# Patient Record
Sex: FEMALE | Race: WHITE | NOT HISPANIC OR LATINO | Employment: FULL TIME | URBAN - METROPOLITAN AREA
[De-identification: names, ages, dates, MRNs, and addresses within clinical notes are randomized per-mention and may not be internally consistent; named-entity substitution may affect disease eponyms.]

---

## 2023-09-01 ENCOUNTER — HOSPITAL ENCOUNTER (OUTPATIENT)
Dept: DATA CONVERSION | Facility: HOSPITAL | Age: 63
Discharge: HOME | End: 2023-09-01
Payer: COMMERCIAL

## 2023-09-01 DIAGNOSIS — E78.00 PURE HYPERCHOLESTEROLEMIA, UNSPECIFIED: ICD-10-CM

## 2023-09-01 DIAGNOSIS — Z00.00 ENCOUNTER FOR GENERAL ADULT MEDICAL EXAMINATION WITHOUT ABNORMAL FINDINGS: ICD-10-CM

## 2023-09-01 DIAGNOSIS — E03.9 HYPOTHYROIDISM, UNSPECIFIED: ICD-10-CM

## 2023-09-01 LAB
ALBUMIN SERPL-MCNC: 4.6 GM/DL (ref 3.5–5)
ALBUMIN/GLOB SERPL: 1.6 RATIO (ref 1.5–3)
ALP BLD-CCNC: 67 U/L (ref 35–125)
ALT SERPL-CCNC: 13 U/L (ref 5–40)
ANION GAP SERPL CALCULATED.3IONS-SCNC: 11 MMOL/L (ref 0–19)
APPEARANCE PLAS: ABNORMAL
AST SERPL-CCNC: 21 U/L (ref 5–40)
BACTERIA UR QL AUTO: NEGATIVE
BASOPHILS # BLD AUTO: 0.07 K/UL (ref 0–0.22)
BASOPHILS NFR BLD AUTO: 1 % (ref 0–1)
BILIRUB SERPL-MCNC: 0.7 MG/DL (ref 0.1–1.2)
BILIRUB UR QL STRIP.AUTO: NEGATIVE
BUN SERPL-MCNC: 14 MG/DL (ref 8–25)
BUN/CREAT SERPL: 12.7 RATIO (ref 8–21)
CALCIUM SERPL-MCNC: 9.5 MG/DL (ref 8.5–10.4)
CHLORIDE SERPL-SCNC: 104 MMOL/L (ref 97–107)
CHOLEST SERPL-MCNC: 344 MG/DL (ref 133–200)
CHOLEST/HDLC SERPL: 4 RATIO
CLARITY UR: CLEAR
CO2 SERPL-SCNC: 26 MMOL/L (ref 24–31)
COLOR SPUN FLD: ABNORMAL
COLOR UR: ABNORMAL
CREAT SERPL-MCNC: 1.1 MG/DL (ref 0.4–1.6)
DEPRECATED RDW RBC AUTO: 49.9 FL (ref 37–54)
DIFFERENTIAL METHOD BLD: NORMAL
EOSINOPHIL # BLD AUTO: 0.14 K/UL (ref 0–0.45)
EOSINOPHIL NFR BLD: 2.1 % (ref 0–3)
ERYTHROCYTE [DISTWIDTH] IN BLOOD BY AUTOMATED COUNT: 14.5 % (ref 11.7–15)
FASTING STATUS PATIENT QL REPORTED: ABNORMAL
GFR SERPL CREATININE-BSD FRML MDRD: 56 ML/MIN/1.73 M2
GLOBULIN SER-MCNC: 2.8 G/DL (ref 1.9–3.7)
GLUCOSE SERPL-MCNC: 96 MG/DL (ref 65–99)
GLUCOSE UR STRIP.AUTO-MCNC: NEGATIVE MG/DL
HCT VFR BLD AUTO: 40.1 % (ref 36–44)
HDLC SERPL-MCNC: 87 MG/DL
HGB BLD-MCNC: 13.4 GM/DL (ref 12–15)
HGB UR QL STRIP.AUTO: 3 /HPF (ref 0–3)
HGB UR QL: NEGATIVE
HYALINE CASTS UR QL AUTO: 3 /LPF
IMM GRANULOCYTES # BLD AUTO: 0.01 K/UL (ref 0–0.1)
KETONES UR QL STRIP.AUTO: NEGATIVE
LDLC SERPL CALC-MCNC: 240 MG/DL (ref 65–130)
LEUKOCYTE ESTERASE UR QL STRIP.AUTO: ABNORMAL
LYMPHOCYTES # BLD AUTO: 2.32 K/UL (ref 1.2–3.2)
LYMPHOCYTES NFR BLD MANUAL: 34.3 % (ref 20–40)
MCH RBC QN AUTO: 31.7 PG (ref 26–34)
MCHC RBC AUTO-ENTMCNC: 33.4 % (ref 31–37)
MCV RBC AUTO: 94.8 FL (ref 80–100)
MONOCYTES # BLD AUTO: 0.51 K/UL (ref 0–0.8)
MONOCYTES NFR BLD MANUAL: 7.5 % (ref 0–8)
NEUTROPHILS # BLD AUTO: 3.72 K/UL
NEUTROPHILS # BLD AUTO: 3.72 K/UL (ref 1.8–7.7)
NEUTROPHILS.IMMATURE NFR BLD: 0.1 % (ref 0–1)
NEUTS SEG NFR BLD: 55 % (ref 50–70)
NITRITE UR QL STRIP.AUTO: NEGATIVE
NRBC BLD-RTO: 0 /100 WBC
PH UR STRIP.AUTO: 6 [PH] (ref 4.6–8)
PLATELET # BLD AUTO: 280 K/UL (ref 150–450)
PMV BLD AUTO: 11.2 CU (ref 7–12.6)
POTASSIUM SERPL-SCNC: 3.7 MMOL/L (ref 3.4–5.1)
PROT SERPL-MCNC: 7.4 G/DL (ref 5.9–7.9)
PROT UR STRIP.AUTO-MCNC: NEGATIVE MG/DL
PSA SERPL-MCNC: 0.1 NG/ML (ref 0–4.1)
RBC # BLD AUTO: 4.23 M/UL (ref 4–4.9)
REFLEX MICROSCOPIC (UA): ABNORMAL
SODIUM SERPL-SCNC: 141 MMOL/L (ref 133–145)
SP GR UR STRIP.AUTO: 1.01 (ref 1–1.03)
SQUAMOUS UR QL AUTO: ABNORMAL /HPF
T4 FREE SERPL-MCNC: 0.6 NG/DL (ref 0.9–1.7)
TRIGL SERPL-MCNC: 86 MG/DL (ref 40–150)
TSH SERPL DL<=0.05 MIU/L-ACNC: 174.8 MIU/L (ref 0.27–4.2)
UROBILINOGEN UR QL STRIP.AUTO: NORMAL MG/DL (ref 0–1)
WBC # BLD AUTO: 6.8 K/UL (ref 4.5–11)
WBC #/AREA URNS AUTO: 5 /HPF (ref 0–3)

## 2024-01-12 ENCOUNTER — ANCILLARY PROCEDURE (OUTPATIENT)
Dept: RADIOLOGY | Facility: CLINIC | Age: 64
End: 2024-01-12
Payer: COMMERCIAL

## 2024-01-12 ENCOUNTER — TELEPHONE (OUTPATIENT)
Dept: PRIMARY CARE | Facility: CLINIC | Age: 64
End: 2024-01-12

## 2024-01-12 ENCOUNTER — LAB (OUTPATIENT)
Dept: LAB | Facility: LAB | Age: 64
End: 2024-01-12
Payer: COMMERCIAL

## 2024-01-12 VITALS — BODY MASS INDEX: 35.87 KG/M2 | HEIGHT: 61 IN | WEIGHT: 190 LBS

## 2024-01-12 DIAGNOSIS — E03.9 HYPOTHYROIDISM, UNSPECIFIED: ICD-10-CM

## 2024-01-12 DIAGNOSIS — E78.00 PURE HYPERCHOLESTEROLEMIA, UNSPECIFIED: Primary | ICD-10-CM

## 2024-01-12 DIAGNOSIS — Z78.0 ASYMPTOMATIC MENOPAUSAL STATE: ICD-10-CM

## 2024-01-12 DIAGNOSIS — Z12.31 ENCOUNTER FOR SCREENING MAMMOGRAM FOR MALIGNANT NEOPLASM OF BREAST: ICD-10-CM

## 2024-01-12 LAB
ALBUMIN SERPL-MCNC: 4.5 G/DL (ref 3.5–5)
ALP BLD-CCNC: 77 U/L (ref 35–125)
ALT SERPL-CCNC: 15 U/L (ref 5–40)
AST SERPL-CCNC: 19 U/L (ref 5–40)
BILIRUB DIRECT SERPL-MCNC: <0.2 MG/DL (ref 0–0.2)
BILIRUB SERPL-MCNC: 0.8 MG/DL (ref 0.1–1.2)
CHOLEST SERPL-MCNC: 196 MG/DL (ref 133–200)
CHOLEST/HDLC SERPL: 2.4 {RATIO}
HDLC SERPL-MCNC: 81 MG/DL
LDLC SERPL CALC-MCNC: 100 MG/DL (ref 65–130)
PROT SERPL-MCNC: 6.9 G/DL (ref 5.9–7.9)
TRIGL SERPL-MCNC: 77 MG/DL (ref 40–150)
TSH SERPL DL<=0.05 MIU/L-ACNC: 29.23 MIU/L (ref 0.27–4.2)

## 2024-01-12 PROCEDURE — 80076 HEPATIC FUNCTION PANEL: CPT

## 2024-01-12 PROCEDURE — 80061 LIPID PANEL: CPT

## 2024-01-12 PROCEDURE — 77085 DXA BONE DENSITY AXL VRT FX: CPT

## 2024-01-12 PROCEDURE — 84443 ASSAY THYROID STIM HORMONE: CPT

## 2024-01-12 PROCEDURE — 36415 COLL VENOUS BLD VENIPUNCTURE: CPT

## 2024-01-12 PROCEDURE — 77067 SCR MAMMO BI INCL CAD: CPT

## 2024-01-12 NOTE — TELEPHONE ENCOUNTER
----- Message from Jason Irby MD sent at 1/12/2024 11:45 AM EST -----  Please let her know that her bone density showed mild osteopenia but no osteoporosis.  She should take Calcium supplements and Vit. D and exercise regularly.  No prescription medication is needed.

## 2024-01-13 DIAGNOSIS — E03.9 HYPOTHYROIDISM, UNSPECIFIED TYPE: Primary | ICD-10-CM

## 2024-01-15 ENCOUNTER — HOSPITAL ENCOUNTER (OUTPATIENT)
Dept: RADIOLOGY | Facility: HOSPITAL | Age: 64
Discharge: HOME | End: 2024-01-15
Payer: COMMERCIAL

## 2024-01-15 ENCOUNTER — TELEPHONE (OUTPATIENT)
Dept: PRIMARY CARE | Facility: CLINIC | Age: 64
End: 2024-01-15
Payer: COMMERCIAL

## 2024-01-15 DIAGNOSIS — R92.8 OTHER ABNORMAL AND INCONCLUSIVE FINDINGS ON DIAGNOSTIC IMAGING OF BREAST: ICD-10-CM

## 2024-01-15 PROCEDURE — 76982 USE 1ST TARGET LESION: CPT | Mod: LT

## 2024-01-15 PROCEDURE — 76642 ULTRASOUND BREAST LIMITED: CPT | Mod: LT

## 2024-01-15 NOTE — TELEPHONE ENCOUNTER
I spoke to Nidia RIOS: breast U/S.  Biopsy is being arranged at [facility] in the near future.

## 2024-01-22 ENCOUNTER — OFFICE VISIT (OUTPATIENT)
Dept: PRIMARY CARE | Facility: CLINIC | Age: 64
End: 2024-01-22
Payer: COMMERCIAL

## 2024-01-22 ENCOUNTER — TELEPHONE (OUTPATIENT)
Dept: PRIMARY CARE | Facility: CLINIC | Age: 64
End: 2024-01-22

## 2024-01-22 VITALS
BODY MASS INDEX: 35.33 KG/M2 | OXYGEN SATURATION: 98 % | DIASTOLIC BLOOD PRESSURE: 72 MMHG | WEIGHT: 187 LBS | HEART RATE: 64 BPM | SYSTOLIC BLOOD PRESSURE: 118 MMHG | RESPIRATION RATE: 18 BRPM

## 2024-01-22 DIAGNOSIS — E78.00 HYPERCHOLESTEROLEMIA: ICD-10-CM

## 2024-01-22 DIAGNOSIS — E03.8 OTHER SPECIFIED HYPOTHYROIDISM: ICD-10-CM

## 2024-01-22 DIAGNOSIS — E03.8 OTHER SPECIFIED HYPOTHYROIDISM: Primary | ICD-10-CM

## 2024-01-22 DIAGNOSIS — F32.0 CURRENT MILD EPISODE OF MAJOR DEPRESSIVE DISORDER WITHOUT PRIOR EPISODE (CMS-HCC): ICD-10-CM

## 2024-01-22 PROBLEM — K21.9 GASTROESOPHAGEAL REFLUX DISEASE WITHOUT ESOPHAGITIS: Status: ACTIVE | Noted: 2024-01-22

## 2024-01-22 PROCEDURE — 1159F MED LIST DOCD IN RCRD: CPT | Performed by: FAMILY MEDICINE

## 2024-01-22 PROCEDURE — 1036F TOBACCO NON-USER: CPT | Performed by: FAMILY MEDICINE

## 2024-01-22 PROCEDURE — 1126F AMNT PAIN NOTED NONE PRSNT: CPT | Performed by: FAMILY MEDICINE

## 2024-01-22 PROCEDURE — 99214 OFFICE O/P EST MOD 30 MIN: CPT | Performed by: FAMILY MEDICINE

## 2024-01-22 RX ORDER — SERTRALINE HYDROCHLORIDE 100 MG/1
TABLET, FILM COATED ORAL
COMMUNITY

## 2024-01-22 RX ORDER — ATORVASTATIN CALCIUM 40 MG/1
40 TABLET, FILM COATED ORAL DAILY
COMMUNITY

## 2024-01-22 RX ORDER — LEVOTHYROXINE SODIUM 125 UG/1
125 TABLET ORAL DAILY
COMMUNITY

## 2024-01-22 ASSESSMENT — PATIENT HEALTH QUESTIONNAIRE - PHQ9
SUM OF ALL RESPONSES TO PHQ9 QUESTIONS 1 AND 2: 0
2. FEELING DOWN, DEPRESSED OR HOPELESS: NOT AT ALL
1. LITTLE INTEREST OR PLEASURE IN DOING THINGS: NOT AT ALL

## 2024-01-22 ASSESSMENT — ENCOUNTER SYMPTOMS
DEPRESSION: 0
OCCASIONAL FEELINGS OF UNSTEADINESS: 0
LOSS OF SENSATION IN FEET: 0

## 2024-01-22 ASSESSMENT — PAIN SCALES - GENERAL: PAINLEVEL: 0-NO PAIN

## 2024-01-22 NOTE — ASSESSMENT & PLAN NOTE
Continue current medication.Even though TSH is elevated is markedly improved since we increased her dose 4 months ago.  She is asymptomatic. Will recheck in 6 months including TSH and thyroxine level.

## 2024-01-22 NOTE — PROGRESS NOTES
Subjective   Patient ID: Libby Smith is a 63 y.o. female who presents for Hypothyroidism and Hyperlipidemia.    HPI   1. LIBBY is seen for follow up of Hypothyroidism.  She is on Synthroid (name brand) 150 mcg.  Recent TSH is elevated with a low thyroxine.     2. LIBBY is seen for also for for GERD.  She had been on Prevacid in the past but no longer is having symptoms and is no longer taking it.     3. LIBBY is seen today also for follow up of Hypercholesterolemia.  She is on atorvastatin 40 (started 9/23). Recent LDL is 100 (down from 240) .      4. LIBBY is seen today also for follow up of depression.  She is doing well on Zoloft 100mg.    Review of Systems  Denies headache, blurred vision, chest pain, shortness of breath, nausea or vomiting, change in bowel habits or leg pain or swelling.    Objective   /72   Pulse 64   Resp 18   Wt 84.8 kg (187 lb)   SpO2 98%   BMI 35.33 kg/m²     Physical Exam  Vitals and nurse's notes reviewed  General: no acute distress  HEENT: Normal  Neck: Supple  Lungs: Clear  Cardio: RRR w/o murmur  Extremities: No edema, no calf tenderness  Neuro: Alert and oriented with no focal deficits    Assessment/Plan   Problem List Items Addressed This Visit             ICD-10-CM       High    Other specified hypothyroidism - Primary E03.8     Continue current medication.Even though TSH is elevated is markedly improved since we increased her dose 4 months ago.  She is asymptomatic. Will recheck in 6 months including TSH and thyroxine level.           Hypercholesterolemia E78.00     Continue current medication and improved low-fat diet.  Will check lipid panel again in 6 months.         Current mild episode of major depressive disorder without prior episode (CMS/HCC) F32.0     Continue current medications.  Recheck in 6 months.

## 2024-01-31 PROBLEM — N63.21 MASS OF UPPER OUTER QUADRANT OF LEFT BREAST: Status: ACTIVE | Noted: 2024-01-31

## 2024-01-31 PROBLEM — R92.8 ABNORMAL FINDING ON BREAST IMAGING: Status: ACTIVE | Noted: 2024-01-31

## 2024-02-01 PROBLEM — E66.9 OBESITY WITH BODY MASS INDEX 30 OR GREATER: Status: ACTIVE | Noted: 2024-02-01

## 2024-02-01 PROBLEM — Q82.8 ACCESSORY SKIN TAGS: Status: ACTIVE | Noted: 2019-06-20

## 2024-02-01 PROBLEM — K21.9 CHRONIC GERD: Status: ACTIVE | Noted: 2020-06-22
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
## 2024-02-01 PROBLEM — F41.8 DEPRESSION WITH ANXIETY: Status: ACTIVE | Noted: 2020-06-22

## 2024-02-01 PROBLEM — E78.49 OTHER HYPERLIPIDEMIA: Status: ACTIVE | Noted: 2019-03-11

## 2024-02-01 PROBLEM — Z12.31 ENCOUNTER FOR SCREENING MAMMOGRAM FOR MALIGNANT NEOPLASM OF BREAST: Status: ACTIVE | Noted: 2020-06-22

## 2024-02-01 PROBLEM — Z78.0 POSTMENOPAUSAL STATE: Status: ACTIVE | Noted: 2023-09-21

## 2024-02-01 NOTE — PROGRESS NOTES
Nidia Smith female   1960 63 y.o.   [id number]      Chief Complaint    New Patient Visit          [facility]  Nidia Smith is a 63 y.o.    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 She denies breast surgery or biopsy. She as no family history of breast cancer.     BREAST IMAGIN2024 bilateral screening mammogram at [facility], BI-RADS Category 0, left breast mass in superolateral quadrant. 1/15/2024 left breast ultrasound, BI-RADS Category 5, left breast 2:00 12cm from nipple 1.6 x 1.6 x 1.1cm irregular indeterminate mass. No axillary ultrasound performed.     REPRODUCTIVE HISTORY: menarche age 11, , age at first birth 20,  menopause age 53, scattered breast tissue     FAMILY CANCER HISTORY:   Mother: skin cancer age 70,    Sister: uterine cancer age 39  Paternal Grandmother: uterine cancer 70,      REVIEW OF SYSTEMS    Constitutional:  Negative for appetite change, fatigue, fever and unexpected weight change.   HENT:  Negative for ear pain, hearing loss, nosebleeds, sore throat and trouble swallowing.    Eyes:  Negative for discharge, itching and visual disturbance.   Respiratory:  Negative for cough, chest tightness and shortness of breath.    Cardiovascular:  Negative for chest pain, palpitations and leg swelling.   Breast: as indicated in HPI  Gastrointestinal:  Negative for abdominal pain, constipation, diarrhea and nausea.   Endocrine: Negative for cold intolerance and heat intolerance.   Genitourinary:  Negative for dysuria, frequency, hematuria, pelvic pain and vaginal bleeding.   Musculoskeletal:  Negative for arthralgias, back pain, gait problem, joint swelling and myalgias.   Skin:  Negative for color change and rash.   Allergic/Immunologic: Negative for environmental allergies and food allergies.   Neurological:  Negative for dizziness, tremors, speech difficulty, weakness, numbness  and headaches.   Hematological:  Does not bruise/bleed easily.   Psychiatric/Behavioral:  Negative for agitation, dysphoric mood and sleep disturbance. The patient is not nervous/anxious.         MEDICATIONS  Current Outpatient Medications   Medication Instructions    atorvastatin (LIPITOR) 40 mg, oral, Daily    levothyroxine (SYNTHROID, LEVOXYL) 125 mcg, oral, Daily    sertraline (Zoloft) 100 mg tablet TABLET BY MOUTH EVERY DAY        ALLERGIES  No Known Allergies         SOCIAL HISTORY      Social History     Tobacco Use    Smoking status: Never     Passive exposure: Never    Smokeless tobacco: Never   Substance Use Topics    Alcohol use: Yes     Comment: rarely        VITALS  Vitals:    02/05/24 0757   BP: 144/80   Pulse: 64        PHYSICAL EXAM  Patient is alert and oriented x3, with appropriate mood. The gait is steady and hand grasps are equal. Sclera clear. The breasts are nearly symmetrical. Left breast palpable mass deep within tissue at 2:00 12 cm from the nipple measuring 2 x 2.5cm deep the breast tissue. Left distal axilla 1 palpable plump lymph node.  The right breast tissue is soft without palpable abnormalities, discrete nodules or masses. The skin and nipples appear normal. There is no cervical, supraclavicular, or axillary lymphadenopathy palpable. Heart rate and rhythm normal, S1 and S2 appreciated. The lungs are clear bilaterally. Abdomen is soft & non-tender.    Physical Exam  Chest:              IMAGING      Time was spent viewing digital images of the radiology testing with the patient. I explained the results in depth, along with suggested explanation for follow up recommendations based on the testing results.          ORDERS  Left ultrasound core biopsy       ASSESSMENT/PLAN  1. Mass of upper outer quadrant of left breast  BI US breast limited left      2. Abnormal finding on breast imaging               Proceed to biopsy. A breast radiology physician will perform the biopsy. Results are  usually available in about 7 business days. I will call patient with results and instruct on next steps and plan.         LARRY Patel-[facility]

## 2024-02-05 ENCOUNTER — HOSPITAL ENCOUNTER (OUTPATIENT)
Dept: RADIOLOGY | Facility: CLINIC | Age: 64
Discharge: HOME | End: 2024-02-05
Payer: COMMERCIAL

## 2024-02-05 ENCOUNTER — OFFICE VISIT (OUTPATIENT)
Dept: SURGICAL ONCOLOGY | Facility: CLINIC | Age: 64
End: 2024-02-05
Payer: COMMERCIAL

## 2024-02-05 VITALS
BODY MASS INDEX: 35.98 KG/M2 | WEIGHT: 190.4 LBS | SYSTOLIC BLOOD PRESSURE: 144 MMHG | HEART RATE: 64 BPM | DIASTOLIC BLOOD PRESSURE: 80 MMHG

## 2024-02-05 DIAGNOSIS — N63.21 MASS OF UPPER OUTER QUADRANT OF LEFT BREAST: ICD-10-CM

## 2024-02-05 DIAGNOSIS — R92.8 ABNORMAL FINDING ON BREAST IMAGING: ICD-10-CM

## 2024-02-05 DIAGNOSIS — R92.8 OTHER ABNORMAL AND INCONCLUSIVE FINDINGS ON DIAGNOSTIC IMAGING OF BREAST: ICD-10-CM

## 2024-02-05 DIAGNOSIS — Z12.31 ENCOUNTER FOR SCREENING MAMMOGRAM FOR MALIGNANT NEOPLASM OF BREAST: Primary | ICD-10-CM

## 2024-02-05 DIAGNOSIS — N63.21 MASS OF UPPER OUTER QUADRANT OF LEFT BREAST: Primary | ICD-10-CM

## 2024-02-05 PROCEDURE — 88360 TUMOR IMMUNOHISTOCHEM/MANUAL: CPT | Mod: TC,AHULAB | Performed by: NURSE PRACTITIONER

## 2024-02-05 PROCEDURE — 88360 TUMOR IMMUNOHISTOCHEM/MANUAL: CPT | Performed by: PATHOLOGY

## 2024-02-05 PROCEDURE — 1159F MED LIST DOCD IN RCRD: CPT | Performed by: NURSE PRACTITIONER

## 2024-02-05 PROCEDURE — C1819 TISSUE LOCALIZATION-EXCISION: HCPCS

## 2024-02-05 PROCEDURE — 88305 TISSUE EXAM BY PATHOLOGIST: CPT | Performed by: PATHOLOGY

## 2024-02-05 PROCEDURE — 19083 BX BREAST 1ST LESION US IMAG: CPT | Mod: LT

## 2024-02-05 PROCEDURE — 2500000005 HC RX 250 GENERAL PHARMACY W/O HCPCS: Performed by: STUDENT IN AN ORGANIZED HEALTH CARE EDUCATION/TRAINING PROGRAM

## 2024-02-05 PROCEDURE — 99214 OFFICE O/P EST MOD 30 MIN: CPT | Mod: 25 | Performed by: NURSE PRACTITIONER

## 2024-02-05 PROCEDURE — 77065 DX MAMMO INCL CAD UNI: CPT | Mod: LT

## 2024-02-05 PROCEDURE — 2720000003 HC TRAY FOLEY SILER W URIMETER

## 2024-02-05 PROCEDURE — 76642 ULTRASOUND BREAST LIMITED: CPT | Mod: LT

## 2024-02-05 PROCEDURE — 2720000007 HC OR 272 NO HCPCS

## 2024-02-05 PROCEDURE — 76642 ULTRASOUND BREAST LIMITED: CPT | Mod: LEFT SIDE | Performed by: STUDENT IN AN ORGANIZED HEALTH CARE EDUCATION/TRAINING PROGRAM

## 2024-02-05 PROCEDURE — 77065 DX MAMMO INCL CAD UNI: CPT | Mod: LEFT SIDE | Performed by: STUDENT IN AN ORGANIZED HEALTH CARE EDUCATION/TRAINING PROGRAM

## 2024-02-05 PROCEDURE — A4648 IMPLANTABLE TISSUE MARKER: HCPCS

## 2024-02-05 PROCEDURE — 99204 OFFICE O/P NEW MOD 45 MIN: CPT | Performed by: NURSE PRACTITIONER

## 2024-02-05 PROCEDURE — 19083 BX BREAST 1ST LESION US IMAG: CPT | Mod: LEFT SIDE | Performed by: STUDENT IN AN ORGANIZED HEALTH CARE EDUCATION/TRAINING PROGRAM

## 2024-02-05 PROCEDURE — 1126F AMNT PAIN NOTED NONE PRSNT: CPT | Performed by: NURSE PRACTITIONER

## 2024-02-05 PROCEDURE — 1036F TOBACCO NON-USER: CPT | Performed by: NURSE PRACTITIONER

## 2024-02-05 RX ADMIN — Medication 10 ML: at 09:46

## 2024-02-05 ASSESSMENT — PAIN - FUNCTIONAL ASSESSMENT
PAIN_FUNCTIONAL_ASSESSMENT: 0-10
PAIN_FUNCTIONAL_ASSESSMENT: 0-10

## 2024-02-05 ASSESSMENT — PAIN SCALES - GENERAL
PAINLEVEL: 0-NO PAIN
PAINLEVEL_OUTOF10: 0 - NO PAIN

## 2024-02-05 NOTE — PATIENT INSTRUCTIONS
Thank you for coming to see me today at [facility]. I recommend biopsy. A breast radiology physician will perform the biopsy. Possible diagnoses include benign, atypical, or cancer as we discussed.  Bruising and mild discomfort after the biopsy is normal and will improve. I normally have results in 7 business days. I will call you with results, please have your phone handy to take my call.    IMPORTANT INFORMATION REGARDING YOUR RESULTS  If you receive medical information from My [facility] Personal Health Record (online chart) your results will be released into your chart. This means you may view or see results of your biopsy or procedure before I contact you directly. If this occurs, please call the office and we will discuss your results over the phone.     You can see your health information, review clinical summaries from office visits & test results online when you follow your health with MY  Chart, a personal health record. To sign up go to [email or web address]. If you need assistance with signing up or trouble getting into your account call [facility] Patient Line 24/7 at [phone].     Should you have any questions or concerns after biopsy, please do not hesitate to call my office at [phone]. If it has been more than a week since your biopsy was performed and you have not been given the results, please call my office [phone]. Thank you for choosing [facility] and trusting me as your healthcare provider. I am honored to be a provider on your health care team and I remain dedicated to helping you achieve your health goals.

## 2024-02-07 ENCOUNTER — TELEPHONE (OUTPATIENT)
Dept: SURGERY | Facility: HOSPITAL | Age: 64
End: 2024-02-07
Payer: COMMERCIAL

## 2024-02-07 NOTE — TELEPHONE ENCOUNTER
"Result Communication    Resulted Orders   Surgical Pathology Exam   Result Value Ref Range    Case Report       Surgical Pathology                                Case: [id number]                                  Authorizing Provider:  KARISSA Wood  Collected:           02/05/2024 1004              Ordering Location:     [facility]       Received:            02/05/2024 1029                                     Center                                                                       Pathologist:           Rajinder Benson DO                                                         Specimen:    BREAST CORE BIOPSY LEFT, Left Breast 2:00 12 cm fn                                         FINAL DIAGNOSIS       A. Left breast mass, ultrasound-guided core biopsy at 2 o'clock, 12 cm from nipple:  -- Invasive ductal carcinoma, grade 2 with associated microcalcifications, see note.  -- Ductal carcinoma in situ, cribriform pattern, intermediate nuclear grade with necrosis and associated microcalcifications.     Note:  In this limited sample, the invasive carcinoma measures up to 11 mm in greatest dimension.  ER, W[place] and HER2 will be reported in an addendum.     peb                  By the signature on this report, the individual or group listed as making the Final Interpretation/Diagnosis certifies that they have reviewed this case.       Clinical History       Ultrasound Guided Core Biopsy Left Breast Mass 2:00 12 cm fn      Gross Description       Received in formalin, labeled with the patient´s name and hospital number and \"left breast 2:00, 12 cm FN\", are multiple irregular/cylindrical segments of yellow-white fatty soft tissue aggregating to 2.0 x 0.8 x 0.3 cm.  The specimen is submitted in toto in two cassettes.  RCC    NOTE:  Ischemia time: Not provided.  This specimen was placed into formalin at: 2/5/2024 at 10:04 AM.      Disclaimer       One or more of the reagents used to perform assays on this " specimen MAY have contained components considered to be analyte specific reagents (ASR's).  ASR's have not been cleared or approved by the U.S. Food and Drug Administration.  These assays were developed and their performance characteristics determined by the Department of Pathology at [facility]. The FDA does not require this test to go through premarket FDA review. This test is used for clinical purposes. It should not be regarded as investigational or for research. This laboratory is certified under the Clinical Laboratory Improvement Amendments (CLIA) as qualified to perform high complexity clinical laboratory testing.  The assays were performed with appropriate positive and negative controls which stained appropriately.         1:55 PM      Results were successfully communicated with the patient and they acknowledged their understanding. Informed breast biopsy shows cancer, she will meet with breast surgeon and plan for care.

## 2024-02-09 LAB
LAB AP ASR DISCLAIMER: NORMAL
LABORATORY COMMENT REPORT: NORMAL
PATH REPORT.ADDENDUM SPEC: NORMAL
PATH REPORT.FINAL DX SPEC: NORMAL
PATH REPORT.GROSS SPEC: NORMAL
PATH REPORT.RELEVANT HX SPEC: NORMAL
PATH REPORT.TOTAL CANCER: NORMAL

## 2024-02-14 NOTE — PROGRESS NOTES
BREAST SURGICAL ONCOLOGY FOLLOW UP     Subjective   Nidia Smith is a 63 y.o. female presents today for follow up with newly diagnosed carcinoma of the left breast.   She is referred by Dr. Jason Irby and Olivia Cheng, CNP  She is here today with her  to discuss these results and develop a treatment plan.    She had a US core biopsy of a left breast mass on 24.   Pathology showed:   FINAL DIAGNOSIS   A. Left breast mass, ultrasound-guided core biopsy at 2 o'clock, 12 cm from nipple:  -- Invasive ductal carcinoma, grade 2 with associated microcalcifications, see note.  -- Ductal carcinoma in situ, cribriform pattern, intermediate nuclear grade with necrosis and associated microcalcifications.       ER greater than 5%, OH 75%, HER2 negative    BREAST IMAGIN2024 bilateral screening mammogram at [facility], BI-RADS Category 0, left breast mass in superolateral quadrant.   1/15/2024 left breast ultrasound, BI-RADS Category 5, left breast 2:00 12cm from nipple 1.6 x 1.6 x 1.1cm irregular indeterminate mass. No axillary ultrasound performed.      REPRODUCTIVE HISTORY: menarche age 11, , age at first birth 20,  menopause age 53, scattered breast tissue      FAMILY CANCER HISTORY:   Mother: skin cancer age 70,    Sister: uterine cancer age 39  Paternal Grandmother: uterine cancer 70,          She is here today to discuss these results and develop a treatment plan.      PHYSICAL EXAM:    General: Alert and oriented x 3.  Mood and affect are appropriate.  Lungs: Clear to auscultation  Heart: Regular rate and rhythm  Abdomen: soft, nontender  Breast: Lymph node exam shows no cervical, supraclavicular, or axillary lymphadenopathy.  Breast exam shows symmetric breasts bilaterally with no skin changes, no dominant masses and no nipple discharge in either breast.  Mild bruising and induration in the lateral left breast.       Assessment/Plan     Clinical stage IA left breast  cancer  -[id number]   Invasive ductal carcinoma, grade 2 ; ER>95%, NV 75%, Her 2 neg   ER>95%, NV 75%, Her2 negative   1.6 cm left breast mass in the 2 o'clock position, 12 cm from the nipple    We discussed surgical options for breast cancer, which would include a lumpectomy (remove just the cancer in the breast with a small amount of normal tissue around it called a margin), followed by radiation therapy (xray treatments to the breast for 4-6 weeks) or mastectomy (remove the entire breast) with or without reconstruction by a plastic surgeon. The survival rate after a lumpectomy with radiation or a mastectomy are the same.  The chance of a recurrence (the cancer coming back) is slightly higher with a lumpectomy (4-6%) than with a mastectomy (2%).    We have discussed the typical risks of radiation treatment. More common risks are 'sunburn' like changes in the breast, fatigue (feeling tired) and change in size of the breast. Much less common risks are skin cancers, rib fracture, damage to the heart (if the cancer is on the left side) and long-term skin changes. If you have radiation therapy, you will meet with a radiation oncologist who will discuss this more. Women over 70 years old who have small, non-aggressive (estrogen and progesterone positive, Her2 negative) breast cancers may be able to omit radiation treatments.  We have discussed options for reconstruction briefly. If you choose to have reconstruction, you will meet with a plastic surgeon who will discuss this more.  If you are having a lumpectomy and the cancer is not able to be felt in your breast, a small magnetic marking clip called a Magseed will be placed in the breast to help locate the breast cancer tissue that will be removed at surgery. This is a minor procedure which will be done by a radiologist sometime before surgery. It is similar to having the marker placed when you had the biopsy.  We have discussed that the need for chemotherapy may not be  determined until after you have surgery. Having a lumpectomy or a mastectomy will not change the decision of whether or nor chemotherapy is needed.   We also discussed proceeding with a sentinel lymph node biopsy. That means to remove a few lymph nodes under the arm to test for cancer.   The risks, benefits, and procedures of all of these were discussed, including bleeding, infection, need for additional surgery to get clear margins, scar tissue formation, deformity of the area, decreased function, mobility or strength of the arm, arm swelling, increased risk of infection in the arm, numbness, pain or burning under the arm and the risk of general anesthesia. We discussed typical recovery after surgery and the typical time until you can resume all activities. She understood this and all of her questions were answered.     I have discussed with the patient the pathophysiology of the disease process and the rationale for the planned surgery. I have explained the anticipated risks and possible complications, the incidences and consequences of those risks and complications, and the expected postoperative course. Alternatives have been discussed including the alternative of no surgery. The patient has been given the opportunity to ask questions and all her questions have been answered to her satisfaction.     Surgery has been recommended. The risks, benefits and procedure have been reviewed with you today.   You may be scheduled for pre-surgical testing and detailed instructions will be given to you at that appointment.  The pathology results from your surgery should be available about 7 days after the procedure. I will call you with the results. If you do not hear from me within 5 days, please call the office at [phone] for your results.     Schedule left lumpectomy with Magseed localization and left axillary sentinel lymph node biopsy    Barb Sanchez MD

## 2024-02-20 ENCOUNTER — OFFICE VISIT (OUTPATIENT)
Dept: SURGICAL ONCOLOGY | Facility: CLINIC | Age: 64
End: 2024-02-20
Payer: COMMERCIAL

## 2024-02-20 ENCOUNTER — PREP FOR PROCEDURE (OUTPATIENT)
Dept: SURGICAL ONCOLOGY | Facility: CLINIC | Age: 64
End: 2024-02-20

## 2024-02-20 VITALS
BODY MASS INDEX: 35.2 KG/M2 | RESPIRATION RATE: 18 BRPM | SYSTOLIC BLOOD PRESSURE: 137 MMHG | HEART RATE: 66 BPM | DIASTOLIC BLOOD PRESSURE: 73 MMHG | HEIGHT: 62 IN | TEMPERATURE: 96.6 F | WEIGHT: 191.3 LBS

## 2024-02-20 DIAGNOSIS — Z17.0 MALIGNANT NEOPLASM OF LEFT BREAST IN FEMALE, ESTROGEN RECEPTOR POSITIVE, UNSPECIFIED SITE OF BREAST (MULTI): Primary | ICD-10-CM

## 2024-02-20 DIAGNOSIS — Z17.0 MALIGNANT NEOPLASM OF UPPER-OUTER QUADRANT OF LEFT BREAST IN FEMALE, ESTROGEN RECEPTOR POSITIVE (MULTI): ICD-10-CM

## 2024-02-20 DIAGNOSIS — Z17.0 MALIGNANT NEOPLASM OF UPPER-OUTER QUADRANT OF LEFT BREAST IN FEMALE, ESTROGEN RECEPTOR POSITIVE (MULTI): Primary | ICD-10-CM

## 2024-02-20 DIAGNOSIS — C50.412 MALIGNANT NEOPLASM OF UPPER-OUTER QUADRANT OF LEFT BREAST IN FEMALE, ESTROGEN RECEPTOR POSITIVE (MULTI): Primary | ICD-10-CM

## 2024-02-20 DIAGNOSIS — C50.912 MALIGNANT NEOPLASM OF LEFT BREAST IN FEMALE, ESTROGEN RECEPTOR POSITIVE, UNSPECIFIED SITE OF BREAST (MULTI): Primary | ICD-10-CM

## 2024-02-20 DIAGNOSIS — C50.412 MALIGNANT NEOPLASM OF UPPER-OUTER QUADRANT OF LEFT BREAST IN FEMALE, ESTROGEN RECEPTOR POSITIVE (MULTI): ICD-10-CM

## 2024-02-20 PROCEDURE — 1160F RVW MEDS BY RX/DR IN RCRD: CPT | Performed by: SURGERY

## 2024-02-20 PROCEDURE — 1036F TOBACCO NON-USER: CPT | Performed by: SURGERY

## 2024-02-20 PROCEDURE — 99205 OFFICE O/P NEW HI 60 MIN: CPT | Performed by: SURGERY

## 2024-02-20 PROCEDURE — 99215 OFFICE O/P EST HI 40 MIN: CPT | Performed by: SURGERY

## 2024-02-20 PROCEDURE — 1126F AMNT PAIN NOTED NONE PRSNT: CPT | Performed by: SURGERY

## 2024-02-20 PROCEDURE — 1159F MED LIST DOCD IN RCRD: CPT | Performed by: SURGERY

## 2024-02-20 RX ORDER — SODIUM CHLORIDE, SODIUM LACTATE, POTASSIUM CHLORIDE, CALCIUM CHLORIDE 600; 310; 30; 20 MG/100ML; MG/100ML; MG/100ML; MG/100ML
100 INJECTION, SOLUTION INTRAVENOUS CONTINUOUS
Status: CANCELLED | OUTPATIENT
Start: 2024-03-27

## 2024-02-20 ASSESSMENT — ENCOUNTER SYMPTOMS
LOSS OF SENSATION IN FEET: 0
DEPRESSION: 0
OCCASIONAL FEELINGS OF UNSTEADINESS: 0

## 2024-02-20 ASSESSMENT — PATIENT HEALTH QUESTIONNAIRE - PHQ9
2. FEELING DOWN, DEPRESSED OR HOPELESS: NOT AT ALL
SUM OF ALL RESPONSES TO PHQ9 QUESTIONS 1 AND 2: 0
1. LITTLE INTEREST OR PLEASURE IN DOING THINGS: NOT AT ALL

## 2024-02-20 ASSESSMENT — PAIN SCALES - GENERAL: PAINLEVEL: 0-NO PAIN

## 2024-02-20 ASSESSMENT — COLUMBIA-SUICIDE SEVERITY RATING SCALE - C-SSRS
6. HAVE YOU EVER DONE ANYTHING, STARTED TO DO ANYTHING, OR PREPARED TO DO ANYTHING TO END YOUR LIFE?: NO
2. HAVE YOU ACTUALLY HAD ANY THOUGHTS OF KILLING YOURSELF?: NO
1. IN THE PAST MONTH, HAVE YOU WISHED YOU WERE DEAD OR WISHED YOU COULD GO TO SLEEP AND NOT WAKE UP?: NO

## 2024-02-21 ENCOUNTER — TELEPHONE (OUTPATIENT)
Dept: SURGICAL ONCOLOGY | Facility: HOSPITAL | Age: 64
End: 2024-02-21

## 2024-03-20 ENCOUNTER — LAB (OUTPATIENT)
Dept: LAB | Facility: LAB | Age: 64
End: 2024-03-20
Payer: COMMERCIAL

## 2024-03-20 DIAGNOSIS — C50.412 MALIGNANT NEOPLASM OF UPPER-OUTER QUADRANT OF LEFT BREAST IN FEMALE, ESTROGEN RECEPTOR POSITIVE (MULTI): ICD-10-CM

## 2024-03-20 DIAGNOSIS — Z17.0 MALIGNANT NEOPLASM OF UPPER-OUTER QUADRANT OF LEFT BREAST IN FEMALE, ESTROGEN RECEPTOR POSITIVE (MULTI): ICD-10-CM

## 2024-03-20 LAB
ANION GAP SERPL CALC-SCNC: 13 MMOL/L
BUN SERPL-MCNC: 16 MG/DL (ref 8–25)
CALCIUM SERPL-MCNC: 9.6 MG/DL (ref 8.5–10.4)
CHLORIDE SERPL-SCNC: 102 MMOL/L (ref 97–107)
CO2 SERPL-SCNC: 26 MMOL/L (ref 24–31)
CREAT SERPL-MCNC: 1.2 MG/DL (ref 0.4–1.6)
EGFRCR SERPLBLD CKD-EPI 2021: 50 ML/MIN/1.73M*2
ERYTHROCYTE [DISTWIDTH] IN BLOOD BY AUTOMATED COUNT: 14.5 % (ref 11.5–14.5)
GLUCOSE SERPL-MCNC: 95 MG/DL (ref 65–99)
HCT VFR BLD AUTO: 40.1 % (ref 36–46)
HGB BLD-MCNC: 12.9 G/DL (ref 12–16)
MCH RBC QN AUTO: 31.4 PG (ref 26–34)
MCHC RBC AUTO-ENTMCNC: 32.2 G/DL (ref 32–36)
MCV RBC AUTO: 98 FL (ref 80–100)
NRBC BLD-RTO: 0 /100 WBCS (ref 0–0)
PLATELET # BLD AUTO: 291 X10*3/UL (ref 150–450)
POTASSIUM SERPL-SCNC: 4.3 MMOL/L (ref 3.4–5.1)
RBC # BLD AUTO: 4.11 X10*6/UL (ref 4–5.2)
SODIUM SERPL-SCNC: 141 MMOL/L (ref 133–145)
WBC # BLD AUTO: 10.2 X10*3/UL (ref 4.4–11.3)

## 2024-03-20 PROCEDURE — 85027 COMPLETE CBC AUTOMATED: CPT

## 2024-03-20 PROCEDURE — 36415 COLL VENOUS BLD VENIPUNCTURE: CPT

## 2024-03-20 PROCEDURE — 80048 BASIC METABOLIC PNL TOTAL CA: CPT

## 2024-03-21 ENCOUNTER — APPOINTMENT (OUTPATIENT)
Dept: RADIOLOGY | Facility: HOSPITAL | Age: 64
End: 2024-03-21
Payer: COMMERCIAL

## 2024-03-22 ENCOUNTER — HOSPITAL ENCOUNTER (OUTPATIENT)
Dept: RADIOLOGY | Facility: HOSPITAL | Age: 64
Discharge: HOME | End: 2024-03-22
Payer: COMMERCIAL

## 2024-03-22 DIAGNOSIS — C50.412 MALIGNANT NEOPLASM OF UPPER-OUTER QUADRANT OF LEFT BREAST IN FEMALE, ESTROGEN RECEPTOR POSITIVE (MULTI): ICD-10-CM

## 2024-03-22 DIAGNOSIS — Z17.0 MALIGNANT NEOPLASM OF UPPER-OUTER QUADRANT OF LEFT BREAST IN FEMALE, ESTROGEN RECEPTOR POSITIVE (MULTI): ICD-10-CM

## 2024-03-22 PROCEDURE — 2500000005 HC RX 250 GENERAL PHARMACY W/O HCPCS: Performed by: RADIOLOGY

## 2024-03-22 PROCEDURE — A4648 IMPLANTABLE TISSUE MARKER: HCPCS

## 2024-03-22 PROCEDURE — 19285 PERQ DEV BREAST 1ST US IMAG: CPT | Mod: LEFT SIDE | Performed by: RADIOLOGY

## 2024-03-22 PROCEDURE — 77065 DX MAMMO INCL CAD UNI: CPT | Mod: LEFT SIDE | Performed by: RADIOLOGY

## 2024-03-22 PROCEDURE — 77065 DX MAMMO INCL CAD UNI: CPT

## 2024-03-22 PROCEDURE — 19285 PERQ DEV BREAST 1ST US IMAG: CPT | Mod: LT

## 2024-03-22 PROCEDURE — 2780000003 HC OR 278 NO HCPCS

## 2024-03-22 RX ORDER — LIDOCAINE HYDROCHLORIDE 10 MG/ML
INJECTION, SOLUTION EPIDURAL; INFILTRATION; INTRACAUDAL; PERINEURAL AS NEEDED
Status: COMPLETED | OUTPATIENT
Start: 2024-03-22 | End: 2024-03-22

## 2024-03-22 RX ADMIN — LIDOCAINE HYDROCHLORIDE 5 ML: 10 INJECTION, SOLUTION EPIDURAL; INFILTRATION; INTRACAUDAL; PERINEURAL at 09:25

## 2024-03-27 ENCOUNTER — APPOINTMENT (OUTPATIENT)
Dept: RADIOLOGY | Facility: HOSPITAL | Age: 64
End: 2024-03-27
Payer: COMMERCIAL

## 2024-03-27 ENCOUNTER — HOSPITAL ENCOUNTER (OUTPATIENT)
Dept: RADIOLOGY | Facility: HOSPITAL | Age: 64
Discharge: HOME | End: 2024-03-27
Payer: COMMERCIAL

## 2024-03-27 ENCOUNTER — ANESTHESIA (OUTPATIENT)
Dept: OPERATING ROOM | Facility: HOSPITAL | Age: 64
End: 2024-03-27
Payer: COMMERCIAL

## 2024-03-27 ENCOUNTER — ANESTHESIA EVENT (OUTPATIENT)
Dept: OPERATING ROOM | Facility: HOSPITAL | Age: 64
End: 2024-03-27
Payer: COMMERCIAL

## 2024-03-27 ENCOUNTER — HOSPITAL ENCOUNTER (OUTPATIENT)
Facility: HOSPITAL | Age: 64
Setting detail: OUTPATIENT SURGERY
Discharge: HOME | End: 2024-03-27
Attending: SURGERY | Admitting: SURGERY
Payer: COMMERCIAL

## 2024-03-27 ENCOUNTER — PHARMACY VISIT (OUTPATIENT)
Dept: PHARMACY | Facility: CLINIC | Age: 64
End: 2024-03-27
Payer: COMMERCIAL

## 2024-03-27 ENCOUNTER — HOSPITAL ENCOUNTER (OUTPATIENT)
Dept: RADIOLOGY | Facility: HOSPITAL | Age: 64
Setting detail: OUTPATIENT SURGERY
Discharge: HOME | End: 2024-03-27
Payer: COMMERCIAL

## 2024-03-27 VITALS
RESPIRATION RATE: 18 BRPM | HEART RATE: 67 BPM | BODY MASS INDEX: 35.15 KG/M2 | DIASTOLIC BLOOD PRESSURE: 73 MMHG | HEIGHT: 62 IN | WEIGHT: 191 LBS | TEMPERATURE: 96.8 F | SYSTOLIC BLOOD PRESSURE: 138 MMHG | OXYGEN SATURATION: 100 %

## 2024-03-27 DIAGNOSIS — C50.412 MALIGNANT NEOPLASM OF UPPER-OUTER QUADRANT OF LEFT BREAST IN FEMALE, ESTROGEN RECEPTOR POSITIVE (MULTI): ICD-10-CM

## 2024-03-27 DIAGNOSIS — Z17.0 MALIGNANT NEOPLASM OF LEFT BREAST IN FEMALE, ESTROGEN RECEPTOR POSITIVE, UNSPECIFIED SITE OF BREAST (MULTI): ICD-10-CM

## 2024-03-27 DIAGNOSIS — Z17.0 MALIGNANT NEOPLASM OF UPPER-OUTER QUADRANT OF LEFT BREAST IN FEMALE, ESTROGEN RECEPTOR POSITIVE (MULTI): ICD-10-CM

## 2024-03-27 DIAGNOSIS — C50.912 MALIGNANT NEOPLASM OF LEFT BREAST IN FEMALE, ESTROGEN RECEPTOR POSITIVE, UNSPECIFIED SITE OF BREAST (MULTI): ICD-10-CM

## 2024-03-27 PROCEDURE — 3700000001 HC GENERAL ANESTHESIA TIME - INITIAL BASE CHARGE: Performed by: SURGERY

## 2024-03-27 PROCEDURE — 3700000002 HC GENERAL ANESTHESIA TIME - EACH INCREMENTAL 1 MINUTE: Performed by: SURGERY

## 2024-03-27 PROCEDURE — 38900 IO MAP OF SENT LYMPH NODE: CPT | Performed by: SURGERY

## 2024-03-27 PROCEDURE — 88342 IMHCHEM/IMCYTCHM 1ST ANTB: CPT | Performed by: PATHOLOGY

## 2024-03-27 PROCEDURE — 2500000004 HC RX 250 GENERAL PHARMACY W/ HCPCS (ALT 636 FOR OP/ED): Performed by: NURSE ANESTHETIST, CERTIFIED REGISTERED

## 2024-03-27 PROCEDURE — 2720000007 HC OR 272 NO HCPCS: Performed by: SURGERY

## 2024-03-27 PROCEDURE — 3600000009 HC OR TIME - EACH INCREMENTAL 1 MINUTE - PROCEDURE LEVEL FOUR: Performed by: SURGERY

## 2024-03-27 PROCEDURE — 38792 RA TRACER ID OF SENTINL NODE: CPT | Performed by: SURGERY

## 2024-03-27 PROCEDURE — 96372 THER/PROPH/DIAG INJ SC/IM: CPT | Performed by: SURGERY

## 2024-03-27 PROCEDURE — 7100000001 HC RECOVERY ROOM TIME - INITIAL BASE CHARGE: Performed by: SURGERY

## 2024-03-27 PROCEDURE — 3600000004 HC OR TIME - INITIAL BASE CHARGE - PROCEDURE LEVEL FOUR: Performed by: SURGERY

## 2024-03-27 PROCEDURE — 38792 RA TRACER ID OF SENTINL NODE: CPT

## 2024-03-27 PROCEDURE — 3430000001 HC RX 343 DIAGNOSTIC RADIOPHARMACEUTICALS: Performed by: SURGERY

## 2024-03-27 PROCEDURE — A4649 SURGICAL SUPPLIES: HCPCS | Performed by: SURGERY

## 2024-03-27 PROCEDURE — 19301 PARTIAL MASTECTOMY: CPT | Performed by: SURGERY

## 2024-03-27 PROCEDURE — A38525 PR BX/REMV,LYMPH NODE,DEEP AXILL: Performed by: ANESTHESIOLOGY

## 2024-03-27 PROCEDURE — 2500000004 HC RX 250 GENERAL PHARMACY W/ HCPCS (ALT 636 FOR OP/ED): Performed by: SURGERY

## 2024-03-27 PROCEDURE — 7100000002 HC RECOVERY ROOM TIME - EACH INCREMENTAL 1 MINUTE: Performed by: SURGERY

## 2024-03-27 PROCEDURE — 19301 PARTIAL MASTECTOMY: CPT

## 2024-03-27 PROCEDURE — 88307 TISSUE EXAM BY PATHOLOGIST: CPT | Performed by: PATHOLOGY

## 2024-03-27 PROCEDURE — RXMED WILLOW AMBULATORY MEDICATION CHARGE

## 2024-03-27 PROCEDURE — 38525 BIOPSY/REMOVAL LYMPH NODES: CPT | Performed by: SURGERY

## 2024-03-27 PROCEDURE — 76098 X-RAY EXAM SURGICAL SPECIMEN: CPT | Performed by: RADIOLOGY

## 2024-03-27 PROCEDURE — A38525 PR BX/REMV,LYMPH NODE,DEEP AXILL: Performed by: NURSE ANESTHETIST, CERTIFIED REGISTERED

## 2024-03-27 PROCEDURE — 76098 X-RAY EXAM SURGICAL SPECIMEN: CPT

## 2024-03-27 PROCEDURE — A9520 TC99 TILMANOCEPT DIAG 0.5MCI: HCPCS | Performed by: SURGERY

## 2024-03-27 PROCEDURE — 38525 BIOPSY/REMOVAL LYMPH NODES: CPT

## 2024-03-27 PROCEDURE — 2500000005 HC RX 250 GENERAL PHARMACY W/O HCPCS: Performed by: SURGERY

## 2024-03-27 PROCEDURE — 7100000010 HC PHASE TWO TIME - EACH INCREMENTAL 1 MINUTE: Performed by: SURGERY

## 2024-03-27 PROCEDURE — 2500000005 HC RX 250 GENERAL PHARMACY W/O HCPCS: Performed by: NURSE ANESTHETIST, CERTIFIED REGISTERED

## 2024-03-27 PROCEDURE — 7100000009 HC PHASE TWO TIME - INITIAL BASE CHARGE: Performed by: SURGERY

## 2024-03-27 PROCEDURE — 88307 TISSUE EXAM BY PATHOLOGIST: CPT | Mod: TC,TRILAB,WESLAB | Performed by: SURGERY

## 2024-03-27 PROCEDURE — 88341 IMHCHEM/IMCYTCHM EA ADD ANTB: CPT | Performed by: PATHOLOGY

## 2024-03-27 RX ORDER — FENTANYL CITRATE 50 UG/ML
50 INJECTION, SOLUTION INTRAMUSCULAR; INTRAVENOUS EVERY 5 MIN PRN
Status: DISCONTINUED | OUTPATIENT
Start: 2024-03-27 | End: 2024-03-27 | Stop reason: HOSPADM

## 2024-03-27 RX ORDER — PROPOFOL 10 MG/ML
INJECTION, EMULSION INTRAVENOUS AS NEEDED
Status: DISCONTINUED | OUTPATIENT
Start: 2024-03-27 | End: 2024-03-27

## 2024-03-27 RX ORDER — SODIUM CHLORIDE, SODIUM LACTATE, POTASSIUM CHLORIDE, CALCIUM CHLORIDE 600; 310; 30; 20 MG/100ML; MG/100ML; MG/100ML; MG/100ML
100 INJECTION, SOLUTION INTRAVENOUS CONTINUOUS
Status: DISCONTINUED | OUTPATIENT
Start: 2024-03-27 | End: 2024-03-27 | Stop reason: HOSPADM

## 2024-03-27 RX ORDER — NORETHINDRONE AND ETHINYL ESTRADIOL 0.5-0.035
KIT ORAL AS NEEDED
Status: DISCONTINUED | OUTPATIENT
Start: 2024-03-27 | End: 2024-03-27

## 2024-03-27 RX ORDER — MIDAZOLAM HYDROCHLORIDE 1 MG/ML
1 INJECTION, SOLUTION INTRAMUSCULAR; INTRAVENOUS ONCE AS NEEDED
Status: DISCONTINUED | OUTPATIENT
Start: 2024-03-27 | End: 2024-03-27 | Stop reason: HOSPADM

## 2024-03-27 RX ORDER — LIDOCAINE HYDROCHLORIDE 10 MG/ML
INJECTION INFILTRATION; PERINEURAL AS NEEDED
Status: DISCONTINUED | OUTPATIENT
Start: 2024-03-27 | End: 2024-03-27

## 2024-03-27 RX ORDER — MIDAZOLAM HYDROCHLORIDE 1 MG/ML
INJECTION, SOLUTION INTRAMUSCULAR; INTRAVENOUS AS NEEDED
Status: DISCONTINUED | OUTPATIENT
Start: 2024-03-27 | End: 2024-03-27

## 2024-03-27 RX ORDER — HYDRALAZINE HYDROCHLORIDE 20 MG/ML
5 INJECTION INTRAMUSCULAR; INTRAVENOUS EVERY 30 MIN PRN
Status: DISCONTINUED | OUTPATIENT
Start: 2024-03-27 | End: 2024-03-27 | Stop reason: HOSPADM

## 2024-03-27 RX ORDER — LIDOCAINE HYDROCHLORIDE 10 MG/ML
0.1 INJECTION INFILTRATION; PERINEURAL ONCE
Status: DISCONTINUED | OUTPATIENT
Start: 2024-03-27 | End: 2024-03-27 | Stop reason: HOSPADM

## 2024-03-27 RX ORDER — LIDOCAINE HYDROCHLORIDE AND EPINEPHRINE 10; 10 MG/ML; UG/ML
INJECTION, SOLUTION INFILTRATION; PERINEURAL AS NEEDED
Status: DISCONTINUED | OUTPATIENT
Start: 2024-03-27 | End: 2024-03-27 | Stop reason: HOSPADM

## 2024-03-27 RX ORDER — ALBUTEROL SULFATE 0.83 MG/ML
2.5 SOLUTION RESPIRATORY (INHALATION) ONCE AS NEEDED
Status: DISCONTINUED | OUTPATIENT
Start: 2024-03-27 | End: 2024-03-27 | Stop reason: HOSPADM

## 2024-03-27 RX ORDER — MEPERIDINE HYDROCHLORIDE 25 MG/ML
12.5 INJECTION INTRAMUSCULAR; INTRAVENOUS; SUBCUTANEOUS EVERY 10 MIN PRN
Status: DISCONTINUED | OUTPATIENT
Start: 2024-03-27 | End: 2024-03-27 | Stop reason: HOSPADM

## 2024-03-27 RX ORDER — BUPIVACAINE HYDROCHLORIDE 5 MG/ML
INJECTION, SOLUTION PERINEURAL AS NEEDED
Status: DISCONTINUED | OUTPATIENT
Start: 2024-03-27 | End: 2024-03-27 | Stop reason: HOSPADM

## 2024-03-27 RX ORDER — ONDANSETRON HYDROCHLORIDE 2 MG/ML
INJECTION, SOLUTION INTRAVENOUS AS NEEDED
Status: DISCONTINUED | OUTPATIENT
Start: 2024-03-27 | End: 2024-03-27

## 2024-03-27 RX ORDER — ISOSULFAN BLUE 50 MG/5ML
INJECTION, SOLUTION SUBCUTANEOUS AS NEEDED
Status: DISCONTINUED | OUTPATIENT
Start: 2024-03-27 | End: 2024-03-27 | Stop reason: HOSPADM

## 2024-03-27 RX ORDER — FENTANYL CITRATE 50 UG/ML
INJECTION, SOLUTION INTRAMUSCULAR; INTRAVENOUS AS NEEDED
Status: DISCONTINUED | OUTPATIENT
Start: 2024-03-27 | End: 2024-03-27

## 2024-03-27 RX ORDER — ONDANSETRON HYDROCHLORIDE 2 MG/ML
4 INJECTION, SOLUTION INTRAVENOUS ONCE AS NEEDED
Status: DISCONTINUED | OUTPATIENT
Start: 2024-03-27 | End: 2024-03-27 | Stop reason: HOSPADM

## 2024-03-27 RX ORDER — TRAMADOL HYDROCHLORIDE 50 MG/1
50 TABLET ORAL EVERY 8 HOURS PRN
Qty: 15 TABLET | Refills: 0 | Status: SHIPPED | OUTPATIENT
Start: 2024-03-27

## 2024-03-27 RX ORDER — PROPOFOL 10 MG/ML
INJECTION, EMULSION INTRAVENOUS CONTINUOUS PRN
Status: DISCONTINUED | OUTPATIENT
Start: 2024-03-27 | End: 2024-03-27

## 2024-03-27 RX ADMIN — FENTANYL CITRATE 25 MCG: 0.05 INJECTION, SOLUTION INTRAMUSCULAR; INTRAVENOUS at 13:04

## 2024-03-27 RX ADMIN — SODIUM CHLORIDE, POTASSIUM CHLORIDE, SODIUM LACTATE AND CALCIUM CHLORIDE: 600; 310; 30; 20 INJECTION, SOLUTION INTRAVENOUS at 12:59

## 2024-03-27 RX ADMIN — EPHEDRINE SULFATE 15 MG: 50 INJECTION, SOLUTION INTRAVENOUS at 11:56

## 2024-03-27 RX ADMIN — EPHEDRINE SULFATE 10 MG: 50 INJECTION, SOLUTION INTRAVENOUS at 12:37

## 2024-03-27 RX ADMIN — MIDAZOLAM HYDROCHLORIDE 2 MG: 1 INJECTION, SOLUTION INTRAMUSCULAR; INTRAVENOUS at 11:35

## 2024-03-27 RX ADMIN — PROPOFOL 40 MCG/KG/MIN: 10 INJECTION, EMULSION INTRAVENOUS at 11:47

## 2024-03-27 RX ADMIN — DEXAMETHASONE SODIUM PHOSPHATE 4 MG: 4 INJECTION, SOLUTION INTRAMUSCULAR; INTRAVENOUS at 11:48

## 2024-03-27 RX ADMIN — SODIUM CHLORIDE, POTASSIUM CHLORIDE, SODIUM LACTATE AND CALCIUM CHLORIDE: 600; 310; 30; 20 INJECTION, SOLUTION INTRAVENOUS at 11:33

## 2024-03-27 RX ADMIN — FENTANYL CITRATE 25 MCG: 0.05 INJECTION, SOLUTION INTRAMUSCULAR; INTRAVENOUS at 12:10

## 2024-03-27 RX ADMIN — Medication 0.5 MILLICURIE: at 11:47

## 2024-03-27 RX ADMIN — FENTANYL CITRATE 25 MCG: 0.05 INJECTION, SOLUTION INTRAMUSCULAR; INTRAVENOUS at 12:27

## 2024-03-27 RX ADMIN — FENTANYL CITRATE 25 MCG: 0.05 INJECTION, SOLUTION INTRAMUSCULAR; INTRAVENOUS at 11:52

## 2024-03-27 RX ADMIN — PROPOFOL 130 MG: 10 INJECTION, EMULSION INTRAVENOUS at 11:41

## 2024-03-27 RX ADMIN — FENTANYL CITRATE 50 MCG: 0.05 INJECTION, SOLUTION INTRAMUSCULAR; INTRAVENOUS at 11:41

## 2024-03-27 RX ADMIN — EPHEDRINE SULFATE 10 MG: 50 INJECTION, SOLUTION INTRAVENOUS at 12:01

## 2024-03-27 RX ADMIN — FENTANYL CITRATE 25 MCG: 0.05 INJECTION, SOLUTION INTRAMUSCULAR; INTRAVENOUS at 12:03

## 2024-03-27 RX ADMIN — FENTANYL CITRATE 25 MCG: 0.05 INJECTION, SOLUTION INTRAMUSCULAR; INTRAVENOUS at 12:43

## 2024-03-27 RX ADMIN — FENTANYL CITRATE 25 MCG: 0.05 INJECTION, SOLUTION INTRAMUSCULAR; INTRAVENOUS at 12:40

## 2024-03-27 RX ADMIN — ONDANSETRON HYDROCHLORIDE 4 MG: 2 INJECTION INTRAMUSCULAR; INTRAVENOUS at 12:37

## 2024-03-27 RX ADMIN — LIDOCAINE HYDROCHLORIDE 50 MG: 10 INJECTION, SOLUTION INFILTRATION; PERINEURAL at 11:41

## 2024-03-27 SDOH — HEALTH STABILITY: MENTAL HEALTH: CURRENT SMOKER: 0

## 2024-03-27 ASSESSMENT — PAIN - FUNCTIONAL ASSESSMENT
PAIN_FUNCTIONAL_ASSESSMENT: 0-10

## 2024-03-27 ASSESSMENT — COLUMBIA-SUICIDE SEVERITY RATING SCALE - C-SSRS
1. IN THE PAST MONTH, HAVE YOU WISHED YOU WERE DEAD OR WISHED YOU COULD GO TO SLEEP AND NOT WAKE UP?: NO
2. HAVE YOU ACTUALLY HAD ANY THOUGHTS OF KILLING YOURSELF?: NO
6. HAVE YOU EVER DONE ANYTHING, STARTED TO DO ANYTHING, OR PREPARED TO DO ANYTHING TO END YOUR LIFE?: NO

## 2024-03-27 ASSESSMENT — PAIN SCALES - GENERAL
PAINLEVEL_OUTOF10: 0 - NO PAIN
PAINLEVEL_OUTOF10: 2
PAIN_LEVEL: 2
PAINLEVEL_OUTOF10: 2
PAINLEVEL_OUTOF10: 0 - NO PAIN
PAINLEVEL_OUTOF10: 2

## 2024-03-27 NOTE — ANESTHESIA PREPROCEDURE EVALUATION
Patient: Nidia Smith    Procedure Information       Date/Time: 03/27/24 1100    Procedures:       Lumpectomy with Magseed localization (Left)      Axillary sentinel lymph node biopsy (Left) - *REQ: Saml, Makenzie, Yanely, Ilan, Julian, Zoey  Nuclear medicine injection, Lymphazurin, LigaSure/Thunderbeat, radiology for specimen radiograph, neoprobe, Sentimag probe    [facility] / [facility]    Surgeons: Barb Sanchez MD            Relevant Problems   Cardiac   (+) Hypercholesterolemia   (+) Other hyperlipidemia      Neuro   (+) Current mild episode of major depressive disorder without prior episode (CMS/HCC)   (+) Depression with anxiety      GI   (+) Chronic GERD   (+) Gastroesophageal reflux disease without esophagitis      Endocrine   (+) Other specified hypothyroidism      GYN   (+) Malignant neoplasm of left breast in female, estrogen receptor positive (CMS/HCC)   (+) Malignant neoplasm of upper-outer quadrant of left breast in female, estrogen receptor positive (CMS/HCC)       Clinical information reviewed:   Tobacco  Allergies  Meds   Med Hx  Surg Hx  OB Status  Fam Hx  Soc   Hx        NPO Detail:  NPO/Void Status  Carbohydrate Drink Given Prior to Surgery? : N  Date of Last Liquid: 03/26/24  Time of Last Liquid: 2130  Date of Last Solid: 03/26/24  Time of Last Solid: 2130  Last Intake Type: Clear fluids; Light meal  Time of Last Void: 0945         Physical Exam    Airway  Mallampati: II  TM distance: >3 FB  Neck ROM: full     Cardiovascular - normal exam     Dental - normal exam     Pulmonary - normal exam     Abdominal - normal exam           Anesthesia Plan    History of general anesthesia?: yes  History of complications of general anesthesia?: no    ASA 2     general     The patient is not a current smoker.  Patient was not previously instructed to abstain from smoking on day of procedure.  Patient did not smoke on day of procedure.  Education provided regarding risk of  obstructive sleep apnea.  intravenous induction   Postoperative administration of opioids is intended.  Trial extubation is planned.  Anesthetic plan and risks discussed with patient.  Use of blood products discussed with patient who consented to blood products.    Plan discussed with CAA, attending and CRNA.

## 2024-03-27 NOTE — H&P
"History Of Present Illness  Nidia Smith is a 64 y.o. female presenting with left breast cancer.     Past Medical History  Past Medical History:   Diagnosis Date    Disease of thyroid gland        Surgical History  Past Surgical History:   Procedure Laterality Date    COLONOSCOPY      CYST REMOVAL      ON NECK        Social History  She reports that she has never smoked. She has never been exposed to tobacco smoke. She has never used smokeless tobacco. She reports that she does not currently use alcohol. She reports that she does not use drugs.    Family History  Family History   Problem Relation Name Age of Onset    Skin cancer Mother      Coronary artery disease Father      Uterine cancer Sister      Uterine cancer Paternal Grandmother          Allergies  Patient has no known allergies.    Review of Systems   All other systems reviewed and are negative.       Physical Exam  Vitals reviewed.   Constitutional:       Appearance: Normal appearance.   Cardiovascular:      Rate and Rhythm: Normal rate and regular rhythm.   Pulmonary:      Effort: Pulmonary effort is normal.      Breath sounds: Normal breath sounds.   Abdominal:      General: Abdomen is flat.      Palpations: Abdomen is soft.   Musculoskeletal:         General: Normal range of motion.   Neurological:      General: No focal deficit present.      Mental Status: She is alert.   Psychiatric:         Mood and Affect: Mood normal.         Behavior: Behavior normal.         Thought Content: Thought content normal.         Judgment: Judgment normal.          Last Recorded Vitals  Blood pressure 176/74, pulse 65, temperature 36.1 °C (97 °F), temperature source Temporal, resp. rate 16, height 1.575 m (5' 2\"), weight 86.6 kg (191 lb), SpO2 98 %.       Assessment/Plan   Principal Problem:    Malignant neoplasm of left breast in female, estrogen receptor positive (CMS/HCC)      Plan: left magseed localized lumpectomy and left axillary sentinel lymph node biopsy   "     I spent 15 minutes in the professional and overall care of this patient.      Barb Sanchez MD

## 2024-03-27 NOTE — POST-PROCEDURE NOTE
1420- pt brought back from pacu,verbal report received. Pt is alert and awake.  at bedside. Drink given. Rx to go called.    1450- vss. Discharge instructions given and explained to pt and . Both verbally understood.     1452- rx to go at bedside.    1455- pt getting dressed at this time with assistance of .     1504- transport at bedside.

## 2024-03-27 NOTE — ANESTHESIA PROCEDURE NOTES
Airway  Date/Time: 3/27/2024 11:45 AM  Urgency: elective    Airway not difficult    Staffing  Performed: CRNA   Authorized by: Bird Banegas MD    Performed by: LARRY Damon-CRNA  Patient location during procedure: OR    Indications and Patient Condition  Indications for airway management: anesthesia  Spontaneous Ventilation: absent  Sedation level: deep  Preoxygenated: yes  Patient position: sniffing  MILS maintained throughout  Mask difficulty assessment: 0 - not attempted    Final Airway Details  Final airway type: supraglottic airway      Successful airway: Size 3     Number of attempts at approach: 1

## 2024-03-27 NOTE — ANESTHESIA POSTPROCEDURE EVALUATION
Patient: Nidia Smith    Procedure Summary       Date: 03/27/24 Room / Location: [facility]    Anesthesia Start: 1133 Anesthesia Stop: 1338    Procedures:       Lumpectomy with Magseed localization (Left: Breast)      Axillary sentinel lymph node biopsy (Left: Axilla) Diagnosis:       Malignant neoplasm of left breast in female, estrogen receptor positive, unspecified site of breast (CMS/HCC)      (Malignant neoplasm of left breast in female, estrogen receptor positive, unspecified site of breast (CMS/HCC) [C50.912, Z17.0])    Surgeons: Barb Sanchez MD Responsible Provider: Bird Banegas MD    Anesthesia Type: general ASA Status: 2            Anesthesia Type: general    Vitals Value Taken Time   /79 03/27/24 1416   Temp 36 °C (96.8 °F) 03/27/24 1415   Pulse 72 03/27/24 1418   Resp 24 03/27/24 1418   SpO2 92 % 03/27/24 1418   Vitals shown include unvalidated device data.    Anesthesia Post Evaluation    Patient location during evaluation: PACU  Patient participation: complete - patient participated  Level of consciousness: sleepy but conscious  Pain score: 2  Pain management: adequate  Multimodal analgesia pain management approach  Airway patency: patent  Cardiovascular status: acceptable  Respiratory status: acceptable  Hydration status: acceptable  Postoperative Nausea and Vomiting: none        No notable events documented.

## 2024-03-27 NOTE — OP NOTE
Lumpectomy with Magseed localization (L), Axillary sentinel lymph node biopsy (L) Operative Note     Date: 3/27/2024  OR Location: [facility]    Name: Nidia Smith, : 1960, Age: 64 y.o., MRN: [id number], Sex: female    Diagnosis  Pre-op Diagnosis     * Malignant neoplasm of left breast in female, estrogen receptor positive, unspecified site of breast (CMS/HCC) [C50.912, Z17.0] Post-op Diagnosis     * Malignant neoplasm of left breast in female, estrogen receptor positive, unspecified site of breast (CMS/HCC) [C50.912, Z17.0]     Procedures  Lumpectomy with Magseed localization  94875 - CT MASTECTOMY PARTIAL    Axillary sentinel lymph node biopsy  22858 - CT BX/EXC LYMPH NODE OPEN DEEP AXILLARY NODE      Surgeons      * Barb Sanchez - Primary    Resident/Fellow/Other Assistant:  Surgeon(s) and Role:     * Maged Deshpande PA-C - Assisting    Procedure Summary  Anesthesia: General  ASA: II  Anesthesia Staff: Anesthesiologist: Bird Banegas MD  CRNA: LARRY Damon-DAYTON  Estimated Blood Loss: 5mL  Intra-op Medications:   Administrations occurring from 1100 to 1245 on 24:   Medication Name Total Dose   lidocaine-epinephrine (Xylocaine W/EPI) 1 %-1:100,000 injection 10 mL   BUPivacaine HCl (Marcaine) 0.5 % (5 mg/mL) injection 10 mL   isosulfan blue (Lumphazurin) 1 % injection 3 mL   lactated Ringer's infusion Cannot be calculated              Anesthesia Record               Intraprocedure I/O Totals          Intake    Propofol Drip 0.00 mL    The total shown is the total volume documented since Anesthesia Start was filed.    lactated Ringer's infusion 1000.00 mL    Total Intake 1000 mL       Output    Est. Blood Loss 25 mL    Total Output 25 mL       Net    Net Volume 975 mL          Specimen:   ID Type Source Tests Collected by Time   1 : L breast lumpectomy 2 o'clock; short=superior, long=lateral Tissue BREAST LUMPECTOMY LEFT SURGICAL PATHOLOGY EXAM Barb Sanchez MD 3/27/2024 1216   2 : additional tissue L  lumpectomy anterior 2 o'clock; short=superior, long=lateral; new anterior margin=green Tissue BREAST LUMPECTOMY LEFT SURGICAL PATHOLOGY EXAM Barb Sanchez MD 3/27/2024 1226   3 : L axillary tissue Tissue AXILLARY CONTENTS SURGICAL PATHOLOGY EXAM Barb Sanchez MD 3/27/2024 1308   4 : L axillary sentinel lymph node #1 target count=7 Tissue SENTINEL LYMPH NODE OTHER SURGICAL PATHOLOGY EXAM Barb Sanchez MD 3/27/2024 1308        Staff:   Circulator: Kati Molina RN  Scrub Person: Michelle Jolley RN         Drains and/or Catheters: none  Tourniquet Times: n/a        Implants: none    Findings: normal anatomy    Indications: Nidia Smith is an 64 y.o. female who is having surgery for Malignant neoplasm of left breast in female, estrogen receptor positive, unspecified site of breast (CMS/[facility]) [C50.912, Z17.0].     The patient was seen in the preoperative area. The risks, benefits, complications, treatment options, non-operative alternatives, expected recovery and outcomes were discussed with the patient. The possibilities of reaction to medication, pulmonary aspiration, injury to surrounding structures, bleeding, recurrent infection, the need for additional procedures, failure to diagnose a condition, and creating a complication requiring transfusion or operation were discussed with the patient. The patient concurred with the proposed plan, giving informed consent.  The site of surgery was properly noted/marked if necessary per policy. The patient has been actively warmed in preoperative area. Preoperative antibiotics are not indicated. Venous thrombosis prophylaxis have been ordered including bilateral sequential compression devices    Procedure Details:   Operative indications: The patient had a mammogram showing a mass in the left breast. A core biopsy showed invasive ductal carcinoma. Surgical options were reviewed in detail with the patient. The patient chose to proceed with a lumpectomy and axillary  sentinel lymph node biopsy. The risks, benefits and procedure were discussed with the patient including bleeding, infection, scar tissue formation, decreased function, mobility, or strength of the upper extremity, pain and numbness of the axilla and upper arm, lymphedema and general anesthesia. She understood all risks and agreed to proceed.     Operative report: The patient was taken to the operating room and placed on the table in the supine position. A timeout was performed. The site was marked preoperatively. She received general anesthesia without complication.  Once in the operating room and after anesthesia was obtained, the patient had the injection of technetium 99m tilmanocept lymphoseek into 2 perireolar areas of the left breast and the injection of 4 cc of Lymphazurin into 4 periareolar areas of the left breast and massaged into the breast for 5 minutes. The sentimag probe was used to identify the Magseed in the breast. The patient was prepped and draped in the usual sterile fashion. A left axillary sentinel lymph node biopsy was performed.  Local anesthesia was obtained and then an incision was made with a 15 blade scalpel in the axilla inferior to the axillary hairline. Dissection was continued with the scalpel. The clavicopectoral fascia was incised, gaining entrance into the axilla. The long thoracic nerve and thoracodorsal nerve were identified in order to avoid injury. Minimal activity was noted in the axilla and no blue dye was seen. I massaged the breast for an additional 5 minutes and decided to proceed with the lumpectomy then proceed with the sentinel lymph node biopsy.The lumpectomy was performed. Local anesthesia was obtained and then an incision was made in the lateral left breast. Dissection continued with Metzenbaum scissors and a Bovie electrocautery. The sentimag probe again was used to identify the Magseed. The area of tissue surrounding the Magseed was grasped with an Allis clamp. The  tissue was removed using Metzenbaum scissors. It was labeled with a short stitch superiorly and a long stitch laterally. The sentimag probe was used to confirm that the Magseed was present in the tissue specimen. The cavity was swept with the sentimag probe and no activity was noted. The specimen was labeled as left breast lumpectomy 2:00 and painted with the vector surgical margin marker kit. The specimen was sent to radiology and a specimen radiograph confirmed that the Magseed, tissue marker clip and area of concern were within the tissue specimen. The specimen was then sent to pathology.  The mass seemed close to the anterior margin and therefore additional tissue was taken from the entire anterior margin of the lumpectomy cavity.  It was marked with a black stitch superiorly and a long stitch laterally.  The new anterior margin was painted green.  It was identified as additional left lumpectomy breast tissue 2:00 anterior margin and was sent to pathology.  The posterior aspect of dissection was at the pectoralis muscle and pectoralis fascial was taken.  Hemostasis was achieved with Bovie electrocautery. After adequate hemostasis, the wound was irrigated and the irrigation fluid was suctioned out. Clips were placed to nereyda the lumpectomy cavity.  A deep layer was closed with interrupted 3-0 Vicryl stitches.  A superficial, subcutaneous layer was closed with interrupted 3-0 Vicryl stitches. The skin was closed with a running subcuticular 4-0 monocryl stitch. Next, the axillary sentinel lymph node biopsy was performed.  No Blue lymphatics were identified.  A lymph node was found which showed some activity with the neoprobe. This lymph node was  from the surrounding tissue. Small lymphatics and vessels were ligated with the LigaSure. This lymph node was identified as left axillary sentinel lymph node #1. Target count was 7. It was sent to pathology.  Additional tissue was identified which had minimal  activity with the neoprobe.  It appeared to possibly be a lymph node and this was excised with small lymphatics and vessels ligated with the LigaSure.  Once removed, it appeared to be only fatty tissue.  It was sent to pathology identified as left axillary tissue.  At this point, there were no other blue stained lymph nodes, no further activity noted with the neoprobe and no abnormally palpated lymph nodes. After adequate hemostasis, the wound was irrigated and the irrigation fluid was suctioned out.  A deep layer was closed with 3-0 Vicryl stitches.  A subcutaneous layer was closed with interrupted 3-0 Vicryl stitches. The skin was closed with a running, subcuticular 4-0 monocryl stitch.  Steri-Strips were placed followed by fluffs and a Surgi-Bra. She tolerated the procedure well and transferred to PACU in stable condition.   Complications:  None; patient tolerated the procedure well.    Disposition: PACU - hemodynamically stable.  Condition: stable     [place] Node Biopsy for Breast Cancer - Left  Operation performed with curative intent Yes   Tracer(s) used to identify sentinel nodes in the upfront surgery (non-neoadjuvant) setting Radioactive tracer and dye   Tracer(s) used to identify sentinel nodes in the neoadjuvant setting N/A   All nodes (colored or non-colored) present at the end of a dye-filled lymphatic channel were removed Yes   All significantly radioactive nodes were removed Yes   All palpably suspicious nodes were removed Yes   Biopsy-proven positive nodes marked with clips prior to chemotherapy were identified and removed N/A         Additional Details: I personally reviewed the specimen radiograph.     Attending Attestation: I performed the procedure.    Barb Sanchez  Phone Number: [phone]

## 2024-03-29 ENCOUNTER — TELEPHONE (OUTPATIENT)
Dept: SURGICAL ONCOLOGY | Facility: CLINIC | Age: 64
End: 2024-03-29
Payer: COMMERCIAL

## 2024-03-29 DIAGNOSIS — C50.412 MALIGNANT NEOPLASM OF UPPER-OUTER QUADRANT OF LEFT BREAST IN FEMALE, ESTROGEN RECEPTOR POSITIVE (MULTI): ICD-10-CM

## 2024-03-29 DIAGNOSIS — Z17.0 MALIGNANT NEOPLASM OF UPPER-OUTER QUADRANT OF LEFT BREAST IN FEMALE, ESTROGEN RECEPTOR POSITIVE (MULTI): ICD-10-CM

## 2024-03-29 NOTE — TELEPHONE ENCOUNTER
Reached out to patient as a courtesy follow-up call post-surgery. Patient stated that pain has been well controlled, & denies any fever/chills. Has showered since surgery and has been completing arm exercises per patient education. No complaints/ concerns or additional questions at this time. She confirmed post-op appointment coming up. Phone call ended appropriately.

## 2024-04-03 NOTE — PROGRESS NOTES
Subjective   Nidia Smith is a 64 y.o. female here for a postoperative visit.  She had a left Magseed lumpectomy and left axillary sentinel lymph node biopsy on March 27, 2024.  She is doing well and has no complaints or concerns.      Objective     Physical Exam  Chest:          Comments: Both incisions are healing well with no evidence of infection, seroma or hematoma.      Alert and oriented.      Assessment/Plan   Clinical stage IA left breast cancer  -[id number]              Invasive ductal carcinoma, grade 2 ; ER>95%, VT 75%, Her 2 neg              ER>95%, VT 75%, Her2 negative    She is doing well following surgery.  She may resume all activities without restrictions.  She will return to work 4/29/2024    I will call you when the pathology results are available.      You will need to schedule an appointment with a medical oncologist to discuss hormonal therapy (estrogen blocking pill) and possible additional treatment.  You will need to schedule an appointment with a radiation oncologist for radiation therapy.    Follow-up with me in January 2025 with a bilateral mammogram.  Barb Sanchez MD

## 2024-04-04 ENCOUNTER — TUMOR BOARD CONFERENCE (OUTPATIENT)
Dept: HEMATOLOGY/ONCOLOGY | Facility: HOSPITAL | Age: 64
End: 2024-04-04
Payer: COMMERCIAL

## 2024-04-05 DIAGNOSIS — Z17.0 MALIGNANT NEOPLASM OF UPPER-OUTER QUADRANT OF LEFT BREAST IN FEMALE, ESTROGEN RECEPTOR POSITIVE (MULTI): ICD-10-CM

## 2024-04-05 DIAGNOSIS — C50.412 MALIGNANT NEOPLASM OF UPPER-OUTER QUADRANT OF LEFT BREAST IN FEMALE, ESTROGEN RECEPTOR POSITIVE (MULTI): ICD-10-CM

## 2024-04-09 ENCOUNTER — TELEPHONE (OUTPATIENT)
Dept: SURGICAL ONCOLOGY | Facility: CLINIC | Age: 64
End: 2024-04-09

## 2024-04-09 ENCOUNTER — OFFICE VISIT (OUTPATIENT)
Dept: SURGICAL ONCOLOGY | Facility: CLINIC | Age: 64
End: 2024-04-09
Payer: COMMERCIAL

## 2024-04-09 VITALS
BODY MASS INDEX: 37.57 KG/M2 | SYSTOLIC BLOOD PRESSURE: 136 MMHG | RESPIRATION RATE: 18 BRPM | DIASTOLIC BLOOD PRESSURE: 85 MMHG | TEMPERATURE: 97.3 F | WEIGHT: 199 LBS | HEIGHT: 61 IN | HEART RATE: 76 BPM

## 2024-04-09 DIAGNOSIS — C50.412 MALIGNANT NEOPLASM OF UPPER-OUTER QUADRANT OF LEFT BREAST IN FEMALE, ESTROGEN RECEPTOR POSITIVE (MULTI): Primary | ICD-10-CM

## 2024-04-09 DIAGNOSIS — Z17.0 MALIGNANT NEOPLASM OF UPPER-OUTER QUADRANT OF LEFT BREAST IN FEMALE, ESTROGEN RECEPTOR POSITIVE (MULTI): Primary | ICD-10-CM

## 2024-04-09 PROCEDURE — 99024 POSTOP FOLLOW-UP VISIT: CPT | Performed by: SURGERY

## 2024-04-09 PROCEDURE — 1160F RVW MEDS BY RX/DR IN RCRD: CPT | Performed by: SURGERY

## 2024-04-09 PROCEDURE — 1036F TOBACCO NON-USER: CPT | Performed by: SURGERY

## 2024-04-09 PROCEDURE — 1126F AMNT PAIN NOTED NONE PRSNT: CPT | Performed by: SURGERY

## 2024-04-09 PROCEDURE — 1159F MED LIST DOCD IN RCRD: CPT | Performed by: SURGERY

## 2024-04-09 ASSESSMENT — ENCOUNTER SYMPTOMS
LOSS OF SENSATION IN FEET: 0
OCCASIONAL FEELINGS OF UNSTEADINESS: 0
DEPRESSION: 0

## 2024-04-09 ASSESSMENT — PAIN SCALES - GENERAL: PAINLEVEL: 0-NO PAIN

## 2024-04-09 NOTE — PATIENT INSTRUCTIONS
Dr. Sanchez will call you with your pathology results once they are available. Follow-up with Dr. Sanchez in Jan 2025 w/ bilateral mammogram. Referrals for medical & radiation oncology have been made for you. Please schedule accordingly.

## 2024-04-09 NOTE — TELEPHONE ENCOUNTER
Result Communication    Resulted Orders   Surgical Pathology Exam   Result Value Ref Range    Case Report       Surgical Pathology                                Case: [id number]                                  Authorizing Provider:  Barb Sanchez MD            Collected:           03/27/2024 1216              Ordering Location:     [facility] [place] Received:            03/27/2024 1403                                     OR                                                                           Pathologist:           Ant Cardenas MD                                                           Specimens:   A) - BREAST LUMPECTOMY LEFT, L breast lumpectomy 2 o'clock; short=superior,                         long=lateral                                                                                        B) - BREAST LUMPECTOMY LEFT, additional tissue L lumpectomy anterior 2 o'clock;                     short=superior, long=lateral; new anterior margin=green                                             C) - AXILLARY CONTENTS, L axillary tissue                                                            D) - SENTINEL LYMPH NODE BREAST LEFT, L axillary sentinel lymph node #1 target                      count=7                                                                                    FINAL DIAGNOSIS         A. LEFT BREAST, 2:00, LUMPECTOMY:   Infiltrating ductal carcinoma, grade 2, 3.0 cm, see comment.   Ductal carcinoma in situ, solid and cribriform types, intermediate to high nuclear grade, with necrosis.   Findings consistent with previous biopsy site.   Columnar alteration of lobular units, apocrine change and microcalcifications.    B. LEFT BREAST, 2:00, ANTERIOR, LUMPECTOMY:   Isolated tumor cells in 1 of 2 intramammary lymph nodes, see comment.    Comment: The submitted tissue includes two intramammary lymph nodes (one with isolated tumor cells).     C. LEFT AXILLARY TISSUE,  EXCISION:   Fibroadipose tissue, negative for malignancy, see comment.    Comment: Lymph node tissue is not seen in the sections examined.    D. LEFT AXILLARY SENTINEL LYMPH NODE (1), EXCISION:   Negative for malignancy.                 By the signature on this report, the individual or group listed as making the Final Interpretation/Diagnosis certifies that they have reviewed this case.       Case Summary Report       INVASIVE CARCINOMA OF THE BREAST: Resection   INVASIVE CARCINOMA OF THE BREAST: RESECTION - All Specimens   8th Edition - Protocol posted: 6/21/2023      SPECIMEN      Procedure:    Excision (less than total mastectomy)       Specimen Laterality:    Left       TUMOR      Tumor Site:    Clock position       :    2 o'clock     Tumor Site:    Distance from nipple (Centimeters): 12 cm    Histologic Type:    Invasive carcinoma of no special type (ductal)     Histologic Grade (Morse Histologic Score):          Glandular (Acinar) / Tubular Differentiation:    Score 3       Nuclear Pleomorphism:    Score 2       Mitotic Rate:    Score 1       Overall Grade:    Grade 2 (scores of 6 or 7)     Tumor Size:    Greatest dimension of largest invasive focus (Millimeters): 30 mm      Additional Dimension (Millimeters):    21 mm      Additional Dimension (Millimeters):    12 mm    Tumor Focality:    Single focus of invasive carcinoma     Ductal Carcinoma In Situ (DCIS):    Present       Size (Extent) of DCIS:    Estimated size (extent) of DCIS is at least (Millimeters): 1-2 mm      Architectural Patterns:    Cribriform       Architectural Patterns:    Solid       Nuclear Grade:    Grade II (intermediate)       Necrosis:    Present, focal (small foci or single cell necrosis)       Number of Blocks with DCIS:    3       Number of Blocks Examined:    12     Lobular Carcinoma In Situ (LCIS):    Not identified     Lymphatic and / or Vascular Invasion:    Not identified     Dermal Lymphatic and / or Vascular Invasion:     No skin present     Microcalcifications:    Present in invasive carcinoma     Microcalcifications:    Present in non-neoplastic tissue     Treatment Effect in the Breast:    No known presurgical therapy       MARGINS    Margin Status for Invasive Carcinoma:    All margins negative for invasive carcinoma       Distance from Invasive Carcinoma to Closest Margin:    Less than: 1 mm      Closest Margin(s) to Invasive Carcinoma:    Anterior       Distance from Invasive Carcinoma to Anterior Margin:    Less than: 1 mm      Distance from Invasive Carcinoma to Posterior Margin:    1 mm      Distance from Invasive Carcinoma to Superior Margin:    2 mm      Distance from Invasive Carcinoma to Inferior Margin:    6 mm      Distance from Invasive Carcinoma to Medial Margin:    29 (gross measurement) mm      Distance from Invasive Carcinoma to Lateral Margin:    19 (gross measurement) mm    Margin Status for DCIS:    All margins negative for DCIS       Distance from DCIS to Closest Margin:    Less than: 1 mm      Closest Margin(s) to DCIS:    Anterior       Distance from DCIS to Anterior Margin:    Less than: 1 mm      Distance from DCIS to Posterior Margin:    10 mm      Distance from DCIS to Superior Margin:    Cannot be determined:         Distance from DCIS to Inferior Margin:    6 mm      Distance from DCIS to Medial Margin:    Cannot be determined:         Distance from DCIS to Lateral Margin:    Cannot be determined:         REGIONAL LYMPH NODES    Regional Lymph Node Status:          :    Tumor present in regional lymph node(s)         Number of Lymph Nodes with Macrometastases:    0         Number of Lymph Nodes with Micrometastases:    0         Number of Lymph Nodes with Isolated Tumor Cells:    1         Size of Largest Nickolas Metastatic Deposit:    Less than: 0.2 mm        Extranodal Extension:    Not identified       Total Number of Lymph Nodes Examined (sentinel and non-sentinel):    3       Number of Juliette  "Nodes Examined:    1       pTNM CLASSIFICATION (AJCC 8th Edition)      Reporting of pT, pN, and (when applicable) pM categories is based on information available to the pathologist at the time the report is issued. As per the AJCC (Chapter 1, 8th Ed.) it is the managing physician’s responsibility to establish the final pathologic stage based upon all pertinent information, including but potentially not limited to this pathology report.    pT Category:    pT2     pN Category:    pN0 (i+)     N Suffix:    (sn)       SPECIAL STUDIES      Estrogen Receptor (ER) Status:    Positive (greater than 10% of cells demonstrate nuclear positivity)         Percentage of Cells with Nuclear Positivity:    >95 %      Progesterone Receptor (PgR) Status:    Positive         Percentage of Cells with Nuclear Positivity:    75 %      HER2 (by immunohistochemistry):    Negative (Score 1+)       Testing Performed on Case Number:    [id number]       Block for Additional Biomarkers/Molecular Studies       Normal Block: A1  Tumor Block: A6      Clinical History       Pre-op diagnosis:  Malignant neoplasm of left breast in female, estrogen receptor positive, unspecified site of breast (CMS/HCC) [C50.912, Z17.0]      Gross Description       A: Received in formalin in a Dubin device, on an accugrid* labeled with the patient's name and hospital number, and \"L breast lumpectomy 2:00\" superior, gel capsule remarkable for a spiral clip within a biopsy cavity is identified in slice 7.  The biopsy cavity measures long lateral\" is an irregular segment of yellow lobulated fibrofatty tissue, 6.5 cm (M-L) x 6.1 cm (S-I) x 2.2 cm (A-P) .  A skin ellipse is not present.  The 0.9 x 0.5 x 0.4 cm, and is located 0.2 cm from the nearest anterior margin.  MJR specimen is oriented with a short superior and a long lateral stitch.  A localizing needle is not identified.  The specimen is remarkable for a linear depressed defect in the anterolateral aspect measuring " "4.5 x 2.0 cm.  The specimen is inked in the following manner:  anterior green, posterior black, superior red, inferior blue, medial yellow, and lateral orange.  The specimen is serially sectioned from medial to lateral to reveal a white-gray firm stellate mass with ill-defined borders identified in slices 5-9 that measures 3.0 x 2.1 x 1.2 cm.  The mass is located 0.7 cm from the posterior margin, it is abutting the anterior margin, 2.0 cm from the superior margin, 1.9 cm from the inferior margin, 2.9 cm from the medial margin, and 1.9 cm from the lateral margin.  A biopsy cavity is identified in slice 7 is markable for a gel capsule, spiral clip and Magseed.  The biopsy cavity measures 0.8 x 0.5 x 0.4 cm, and is located 0.2 cm from the nearest anterior margin.  The remainder of the breast parenchyma is yellow lobulated and glistening.  Photographs have been taken.  Representative sections submitted in 11 cassettes.  MJR      Summary of Cassettes:  Specimen Label Site  A  1 slice 1, lateral margin, perpendicular    2 slice 4, bisected, adjacent to mass to include anterior posterior superior margin    3 slice 4, bisected, adjacent to mass to include anterior posterior inferior margin    4 slice 5, bisected to include mass with anterior superior and posterior margin    5 slice 5, bisected to include mass with anterior inferior and posterior margin    6 slice 7, bisected to include mass with anterior superior and posterior margin and biopsy cavity (clip and Magseed)    7 slice 7, bisected to include mass with anterior superior posterior margin and biopsy cavity (clip and Magseed)    8 slice 9, bisected with mass to include anterior superior posterior margin    9 slice 9, bisected with mass to include anterior inferior and posterior margin    10-11 slice 15, medial margin, perpendicular          B: Received in formalin labeled with the patient's name and hospital number, and \"additional tissue left lumpectomy anterior " "2:00; short equals superior, long lateral; new anterior margin)\" is an irregular segment of yellow lobulated fibrofatty tissue, 5.2 cm (medial-lateral) x 3.2 cm (superior-inferior) x 1.5 cm (anterior - posterior) cm.  A skin ellipse is not present.  The specimen is oriented with a short superior and a long lateral stitch.  The specimen is also marked with yellow ink per the surgeon as the new anterior margin.  The specimen is inked in the following manner:  anterior green, posterior black, superior red, inferior blue, medial yellow, and lateral orange.  The posterior aspect of the specimen is considered the old margin and is inked black.  The specimen is serially sectioned from medial to lateral to reveal yellow lobulated glistening cut surfaces.  Slices 2-3 are remarkable for a rubbery white-gray rounded nodule measuring 0.8 x 0.4 x 0.2 cm, located 0.3 cm from the anterior margin, 0.8 cm from the inferior margin, 1.4 cm from the superior margin, 0.7 cm from the posterior margin, 0.4 cm from the medial margin, and 3.7 cm from the lateral margin.  A photograph has been taken.  Representative sections are submitted in 6 cassettes.   [facility]      Specimen Label Site  B  1 slice 1, medial margin, perpendicular    2 slice 2, to include anterior posterior superior inferior margin with nodule    3 slice 3, to include anterior posterior superior inferior margin with nodule    4 slice 4, to include anterior posterior superior inferior margin    5 slice 8, to include anterior posterior superior inferior margin    6 slice 11, lateral margin, perpendicular              C: Received in formalin, labeled with the patient's name and hospital number and \" left axillary tissue\", are multiple yellow lobulated adipose tissue aggregating to 3.7 x 2.5 x 1.0 cm.  Palpation dissection of the adipose tissue reveals 2 possible lymph nodes measuring 0.5 x 0.3 x 0.2 cm and 0.9 x 0.7 x 0.3 cm.  Specimen entirely submitted in 4 " "cassettes.  R    Summary of Cassettes:  Specimen Label Site  C  1 1 possible lymph node, bisected    2 1 possible lymph node, trisected    3-4 remainder of adipose tissue       D: Received in formalin, labeled with the patient's name and hospital number and \"left axillary sentinel lymph node #1 target count equals 7\", is a possible lymph node with attached adipose tissue measuring 2.1 x 1.4 x 0.6 cm.  The specimen is serially sectioned and entirely submitted in 2 cassettes.  R      Disclaimer       One or more of the reagents used to perform assays on this specimen MAY have contained components considered to be analyte specific reagents (ASR's).  ASR's have not been cleared or approved by the [place]. Food and Drug Administration.  These assays were developed and their performance characteristics determined by the Department of Pathology at [facility]. The FDA does not require this test to go through premarket FDA review. This test is used for clinical purposes. It should not be regarded as investigational or for research. This laboratory is certified under the Clinical Laboratory Improvement Amendments (CLIA) as qualified to perform high complexity clinical laboratory testing.  The assays were performed with appropriate positive and negative controls which stained appropriately.         3:14 PM    The patient is called and the pathology report results were left on voicemail.  Will call her again tomorrow to review them.  "

## 2024-04-10 ENCOUNTER — TELEPHONE (OUTPATIENT)
Dept: SURGERY | Facility: HOSPITAL | Age: 64
End: 2024-04-10
Payer: COMMERCIAL

## 2024-04-10 NOTE — TELEPHONE ENCOUNTER
Result Communication    Resulted Orders   Surgical Pathology Exam   Result Value Ref Range    Case Report       Surgical Pathology                                Case: [id number]                                  Authorizing Provider:  Barb Sanchez MD            Collected:           03/27/2024 1216              Ordering Location:     [facility] Received:            03/27/2024 1400                                     OR                                                                           Pathologist:           Ant Cardenas MD                                                           Specimens:   A) - BREAST LUMPECTOMY LEFT, L breast lumpectomy 2 o'clock; short=superior,                         long=lateral                                                                                        B) - BREAST LUMPECTOMY LEFT, additional tissue L lumpectomy anterior 2 o'clock;                     short=superior, long=lateral; new anterior margin=green                                             C) - AXILLARY CONTENTS, L axillary tissue                                                            D) - SENTINEL LYMPH NODE BREAST LEFT, L axillary sentinel lymph node #1 target                      count=7                                                                                    FINAL DIAGNOSIS         A. LEFT BREAST, 2:00, LUMPECTOMY:   Infiltrating ductal carcinoma, grade 2, 3.0 cm, see comment.   Ductal carcinoma in situ, solid and cribriform types, intermediate to high nuclear grade, with necrosis.   Findings consistent with previous biopsy site.   Columnar alteration of lobular units, apocrine change and microcalcifications.    B. LEFT BREAST, 2:00, ANTERIOR, LUMPECTOMY:   Isolated tumor cells in 1 of 2 intramammary lymph nodes, see comment.    Comment: The submitted tissue includes two intramammary lymph nodes (one with isolated tumor cells).     C. LEFT AXILLARY TISSUE,  EXCISION:   Fibroadipose tissue, negative for malignancy, see comment.    Comment: Lymph node tissue is not seen in the sections examined.    D. LEFT AXILLARY SENTINEL LYMPH NODE (1), EXCISION:   Negative for malignancy.                 By the signature on this report, the individual or group listed as making the Final Interpretation/Diagnosis certifies that they have reviewed this case.       Case Summary Report       INVASIVE CARCINOMA OF THE BREAST: Resection   INVASIVE CARCINOMA OF THE BREAST: RESECTION - All Specimens   8th Edition - Protocol posted: 6/21/2023      SPECIMEN      Procedure:    Excision (less than total mastectomy)       Specimen Laterality:    Left       TUMOR      Tumor Site:    Clock position       :    2 o'clock     Tumor Site:    Distance from nipple (Centimeters): 12 cm    Histologic Type:    Invasive carcinoma of no special type (ductal)     Histologic Grade ([place] Histologic Score):          Glandular (Acinar) / Tubular Differentiation:    Score 3       Nuclear Pleomorphism:    Score 2       Mitotic Rate:    Score 1       Overall Grade:    Grade 2 (scores of 6 or 7)     Tumor Size:    Greatest dimension of largest invasive focus (Millimeters): 30 mm      Additional Dimension (Millimeters):    21 mm      Additional Dimension (Millimeters):    12 mm    Tumor Focality:    Single focus of invasive carcinoma     Ductal Carcinoma In Situ (DCIS):    Present       Size (Extent) of DCIS:    Estimated size (extent) of DCIS is at least (Millimeters): 1-2 mm      Architectural Patterns:    Cribriform       Architectural Patterns:    Solid       Nuclear Grade:    Grade II (intermediate)       Necrosis:    Present, focal (small foci or single cell necrosis)       Number of Blocks with DCIS:    3       Number of Blocks Examined:    12     Lobular Carcinoma In Situ (LCIS):    Not identified     Lymphatic and / or Vascular Invasion:    Not identified     Dermal Lymphatic and / or Vascular Invasion:     No skin present     Microcalcifications:    Present in invasive carcinoma     Microcalcifications:    Present in non-neoplastic tissue     Treatment Effect in the Breast:    No known presurgical therapy       MARGINS    Margin Status for Invasive Carcinoma:    All margins negative for invasive carcinoma       Distance from Invasive Carcinoma to Closest Margin:    Less than: 1 mm      Closest Margin(s) to Invasive Carcinoma:    Anterior       Distance from Invasive Carcinoma to Anterior Margin:    Less than: 1 mm      Distance from Invasive Carcinoma to Posterior Margin:    1 mm      Distance from Invasive Carcinoma to Superior Margin:    2 mm      Distance from Invasive Carcinoma to Inferior Margin:    6 mm      Distance from Invasive Carcinoma to Medial Margin:    29 (gross measurement) mm      Distance from Invasive Carcinoma to Lateral Margin:    19 (gross measurement) mm    Margin Status for DCIS:    All margins negative for DCIS       Distance from DCIS to Closest Margin:    Less than: 1 mm      Closest Margin(s) to DCIS:    Anterior       Distance from DCIS to Anterior Margin:    Less than: 1 mm      Distance from DCIS to Posterior Margin:    10 mm      Distance from DCIS to Superior Margin:    Cannot be determined:         Distance from DCIS to Inferior Margin:    6 mm      Distance from DCIS to Medial Margin:    Cannot be determined:         Distance from DCIS to Lateral Margin:    Cannot be determined:         REGIONAL LYMPH NODES    Regional Lymph Node Status:          :    Tumor present in regional lymph node(s)         Number of Lymph Nodes with Macrometastases:    0         Number of Lymph Nodes with Micrometastases:    0         Number of Lymph Nodes with Isolated Tumor Cells:    1         Size of Largest Nickolas Metastatic Deposit:    Less than: 0.2 mm        Extranodal Extension:    Not identified       Total Number of Lymph Nodes Examined (sentinel and non-sentinel):    3       Number of Magazine  "Nodes Examined:    1       pTNM CLASSIFICATION (AJCC 8th Edition)      Reporting of pT, pN, and (when applicable) pM categories is based on information available to the pathologist at the time the report is issued. As per the AJCC (Chapter 1, 8th Ed.) it is the managing physician’s responsibility to establish the final pathologic stage based upon all pertinent information, including but potentially not limited to this pathology report.    pT Category:    pT2     pN Category:    pN0 (i+)     N Suffix:    (sn)       SPECIAL STUDIES      Estrogen Receptor (ER) Status:    Positive (greater than 10% of cells demonstrate nuclear positivity)         Percentage of Cells with Nuclear Positivity:    >95 %      Progesterone Receptor (PgR) Status:    Positive         Percentage of Cells with Nuclear Positivity:    75 %      HER2 (by immunohistochemistry):    Negative (Score 1+)       Testing Performed on Case Number:    [id number]       Block for Additional Biomarkers/Molecular Studies       Normal Block: A1  Tumor Block: A6      Clinical History       Pre-op diagnosis:  Malignant neoplasm of left breast in female, estrogen receptor positive, unspecified site of breast (CMS/HCC) [C50.912, Z17.0]      Gross Description       A: Received in formalin in a Dubin device, on an accugrid* labeled with the patient's name and hospital number, and \"L breast lumpectomy 2:00\" superior, gel capsule remarkable for a spiral clip within a biopsy cavity is identified in slice 7.  The biopsy cavity measures long lateral\" is an irregular segment of yellow lobulated fibrofatty tissue, 6.5 cm (M-L) x 6.1 cm (S-I) x 2.2 cm (A-P) .  A skin ellipse is not present.  The 0.9 x 0.5 x 0.4 cm, and is located 0.2 cm from the nearest anterior margin.  MJR specimen is oriented with a short superior and a long lateral stitch.  A localizing needle is not identified.  The specimen is remarkable for a linear depressed defect in the anterolateral aspect measuring " "4.5 x 2.0 cm.  The specimen is inked in the following manner:  anterior green, posterior black, superior red, inferior blue, medial yellow, and lateral orange.  The specimen is serially sectioned from medial to lateral to reveal a white-gray firm stellate mass with ill-defined borders identified in slices 5-9 that measures 3.0 x 2.1 x 1.2 cm.  The mass is located 0.7 cm from the posterior margin, it is abutting the anterior margin, 2.0 cm from the superior margin, 1.9 cm from the inferior margin, 2.9 cm from the medial margin, and 1.9 cm from the lateral margin.  A biopsy cavity is identified in slice 7 is markable for a gel capsule, spiral clip and Magseed.  The biopsy cavity measures 0.8 x 0.5 x 0.4 cm, and is located 0.2 cm from the nearest anterior margin.  The remainder of the breast parenchyma is yellow lobulated and glistening.  Photographs have been taken.  Representative sections submitted in 11 cassettes.  MJR      Summary of Cassettes:  Specimen Label Site  A  1 slice 1, lateral margin, perpendicular    2 slice 4, bisected, adjacent to mass to include anterior posterior superior margin    3 slice 4, bisected, adjacent to mass to include anterior posterior inferior margin    4 slice 5, bisected to include mass with anterior superior and posterior margin    5 slice 5, bisected to include mass with anterior inferior and posterior margin    6 slice 7, bisected to include mass with anterior superior and posterior margin and biopsy cavity (clip and Magseed)    7 slice 7, bisected to include mass with anterior superior posterior margin and biopsy cavity (clip and Magseed)    8 slice 9, bisected with mass to include anterior superior posterior margin    9 slice 9, bisected with mass to include anterior inferior and posterior margin    10-11 slice 15, medial margin, perpendicular          B: Received in formalin labeled with the patient's name and hospital number, and \"additional tissue left lumpectomy anterior " "2:00; short equals superior, long lateral; new anterior margin)\" is an irregular segment of yellow lobulated fibrofatty tissue, 5.2 cm (medial-lateral) x 3.2 cm (superior-inferior) x 1.5 cm (anterior - posterior) cm.  A skin ellipse is not present.  The specimen is oriented with a short superior and a long lateral stitch.  The specimen is also marked with yellow ink per the surgeon as the new anterior margin.  The specimen is inked in the following manner:  anterior green, posterior black, superior red, inferior blue, medial yellow, and lateral orange.  The posterior aspect of the specimen is considered the old margin and is inked black.  The specimen is serially sectioned from medial to lateral to reveal yellow lobulated glistening cut surfaces.  Slices 2-3 are remarkable for a rubbery white-gray rounded nodule measuring 0.8 x 0.4 x 0.2 cm, located 0.3 cm from the anterior margin, 0.8 cm from the inferior margin, 1.4 cm from the superior margin, 0.7 cm from the posterior margin, 0.4 cm from the medial margin, and 3.7 cm from the lateral margin.  A photograph has been taken.  Representative sections are submitted in 6 cassettes.   [facility]      Specimen Label Site  B  1 slice 1, medial margin, perpendicular    2 slice 2, to include anterior posterior superior inferior margin with nodule    3 slice 3, to include anterior posterior superior inferior margin with nodule    4 slice 4, to include anterior posterior superior inferior margin    5 slice 8, to include anterior posterior superior inferior margin    6 slice 11, lateral margin, perpendicular              C: Received in formalin, labeled with the patient's name and hospital number and \" left axillary tissue\", are multiple yellow lobulated adipose tissue aggregating to 3.7 x 2.5 x 1.0 cm.  Palpation dissection of the adipose tissue reveals 2 possible lymph nodes measuring 0.5 x 0.3 x 0.2 cm and 0.9 x 0.7 x 0.3 cm.  Specimen entirely submitted in 4 " "cassettes.  R    Summary of Cassettes:  Specimen Label Site  C  1 1 possible lymph node, bisected    2 1 possible lymph node, trisected    3-4 remainder of adipose tissue       D: Received in formalin, labeled with the patient's name and hospital number and \"left axillary sentinel lymph node #1 target count equals 7\", is a possible lymph node with attached adipose tissue measuring 2.1 x 1.4 x 0.6 cm.  The specimen is serially sectioned and entirely submitted in 2 cassettes.  R      Disclaimer       One or more of the reagents used to perform assays on this specimen MAY have contained components considered to be analyte specific reagents (ASR's).  ASR's have not been cleared or approved by the U.S. Food and Drug Administration.  These assays were developed and their performance characteristics determined by the Department of Pathology at [facility]. The FDA does not require this test to go through premarket FDA review. This test is used for clinical purposes. It should not be regarded as investigational or for research. This laboratory is certified under the Clinical Laboratory Improvement Amendments (CLIA) as qualified to perform high complexity clinical laboratory testing.  The assays were performed with appropriate positive and negative controls which stained appropriately.         9:08 AM      Results were successfully communicated with the patient and they acknowledged their understanding.    "

## 2024-04-11 ENCOUNTER — TUMOR BOARD CONFERENCE (OUTPATIENT)
Dept: HEMATOLOGY/ONCOLOGY | Facility: HOSPITAL | Age: 64
End: 2024-04-11
Payer: COMMERCIAL

## 2024-04-11 NOTE — TUMOR BOARD NOTE
MULTIDISCIPLINARY BREAST CANCER TUMOR BOARD CONFERENCE NOTE  Nidia Smith was presented at Breast Cancer Tumor Board Conference  Conference date: 4/11/2024  Presenting Provider(s): Dr. Barb Sanchez  Present at Conference: Medical Oncology, Radiation Oncology, Surgical Oncology, Radiology, and Pathology Representatives  Conference Review Type: Pathology Review    National Guidelines discussed: Yes    Surgical Resection: Yes, surgery is complete.  Radiation therapy: Refer  Genomic Testing: yes, OncotypeDx  Systemic therapy: Endocrine therapy and bisphosphonate   Clinical Trial Eligible: no   Genetics: Not indicated    Referral Recommendations:  Medical Oncology and Radiation Oncology    Cancer Staging:  Cancer Staging   Malignant neoplasm of upper-outer quadrant of left breast in female, estrogen receptor positive (CMS/HCC)  Staging form: Breast, AJCC 8th Edition  - Clinical stage from 2/5/2024: Stage IA (cT1, cN0, cM0, G2, ER+, NC+, HER2-) - Unsigned  - Pathologic stage from 3/27/2024: Stage IA (pT2, pN0(i+)(sn), cM0, G2, ER+, NC+, HER2-) - Unsigned       Disclaimer  SCC tumor board recommendations represent the consensus opinion of physicians present at a weekly patient care conference. The treating [facility] physician is not always present, and many of the physicians formulating the recommendation have not personally seen or examined the patient under discussion. It is understood that the treating [facility] physician considers the expertise of the Tumor Board Recommendation in formulating his/her plan for the patient. However, in many situations, based on individualized patient considerations, a different plan is determined by the treating physician to be the optimal medical management.    Ariannaibe Attestation  By signing my name below, Sumeet NEAL Scribe   attest that this documentation has been prepared under the direction and in the presence of BREAST TUMOR BOARD.

## 2024-04-14 NOTE — PROGRESS NOTES
Radiation Oncology Outpatient Consult    Patient Name:  Nidia Smith  MRN:  [id number]  :  1960    Referring Provider: Barb Sanchez MD  Primary Care Provider: Jason Irby MD  Care Team: Patient Care Team:  Jason Irby MD as PCP - General (Family Medicine)  Jason Irby MD as Primary Care Provider    Date of Service: 2024     Diagnosis: Infiltrating ductal carcinoma of upper-outer quadrant of left breast in female (Multi), Clinical: Stage IA (cT1, cN0, cM0, G2, ER+, KS+, HER2-)  Infiltrating ductal carcinoma of upper-outer quadrant of left breast in female (Multi), Pathologic: Stage IA (pT2, pN0(i+)(sn), cM0, G2, ER+, KS+, HER2-)    Previous Treatments:  3/27/2024: left breast lumpectomy and sentinel node biopsy    History of Present Illness:  Ms. Smith is a 64 y.o. woman who had a routine screening mammogram on 2024 that showed spiculated mass in the upper outer quadrant of the left breast. Targeted ultrasound on 1/15/2024 showed a 15.9 x 15.6 x 11.3 mm mass in the left breast at the 2 o'clock position, 12 cm from the nipple. Ultrasound-guided biopsy of the left breast mass was performed on 2024, with pathology positive for invasive ductal carcinoma, grade 2, ER/KS positive, HER2/gerber negative. She then underwent a left breast lumpectomy and sentinel node biopsy on 3/27/2024, which showed a 3 cm invasive ductal carcinoma, grade 2, with negative margins (closest was less than 1 mm, anterior; 1 mm, posterior). There was no LVI. There was 1 sentinel node removed, which was negative for metastatic carcinoma.  There were 2 intramammary lymph nodes removed, with isolated tumor cells found in 1/2 nodes. DCIS component was removed as well, 1-2 mm in size, with negative margins, and focal necrosis.  Her Oncotype score was pending at the time of consultation.    Currently, she overall feels well. She denies any breast pain or discomfort, but has some mild numbness along her outer left upper  extremity. She denies any breast masses or lesions. She denies any bleeding or wound difficulties.        The patient was seen for an opinion regarding radiation options for the diagnosis above. I personally reviewed the available outside records and imaging studies as detailed in the HPI. The allergies, medications, past medical history, surgical history, social history, family history, and review of systems were reviewed.     Prior Radiotherapy:  No  Radiation Treatments       No radiation treatments to show. (Treatments may have been administered in another system.)          Current Systemic Treatment:  No     Presence of Pacemaker or ICD:  No    Past Medical History:    Past Medical History:   Diagnosis Date    Disease of thyroid gland         Past Surgical History:    Past Surgical History:   Procedure Laterality Date    COLONOSCOPY      CYST REMOVAL      ON NECK        Family History:  Cancer-related family history includes Skin cancer in her mother; Uterine cancer in her paternal grandmother and sister.    Social History:    Social History     Tobacco Use    Smoking status: Never     Passive exposure: Never    Smokeless tobacco: Never   Vaping Use    Vaping status: Never Used   Substance Use Topics    Alcohol use: Not Currently     Comment: rarely    Drug use: Never       Allergies:  No Known Allergies     Medications:    Current Outpatient Medications:     atorvastatin (Lipitor) 40 mg tablet, Take 1 tablet (40 mg) by mouth once daily., Disp: , Rfl:     levothyroxine (Synthroid, Levoxyl) 125 mcg tablet, Take 1 tablet (125 mcg) by mouth once daily., Disp: , Rfl:     sertraline (Zoloft) 100 mg tablet, TABLET BY MOUTH EVERY DAY, Disp: , Rfl:     traMADol (Ultram) 50 mg tablet, Take 1 tablet (50 mg) by mouth every 8 hours if needed for severe pain (7 - 10)., Disp: 15 tablet, Rfl: 0      Review of Systems:  Review of Systems - Oncology  The patient's current pain level was assessed.  They report currently having a  pain of 0 out of 10.  They feel their pain is under control without the use of pain medications.    Performance Status:  The Karnofsky performance scale today is 90, Able to carry on normal activity; minor signs or symptoms of disease (ECOG equivalent 0).        OBJECTIVE  Physical Exam:  /90 (BP Location: Left arm, Patient Position: Sitting, BP Cuff Size: Adult long)   Pulse 81   Temp 35.7 °C (96.3 °F) (Temporal)   Resp 16   Wt 90.2 kg (198 lb 15.4 oz)   SpO2 97%   BMI 38.10 kg/m²    Physical Exam  Vitals reviewed. Exam conducted with a chaperone present.   Constitutional:       General: She is not in acute distress.     Appearance: Normal appearance. She is not ill-appearing.   HENT:      Head: Normocephalic and atraumatic.      Right Ear: External ear normal.      Left Ear: External ear normal.      Mouth/Throat:      Mouth: Mucous membranes are moist.      Pharynx: Oropharynx is clear.   Eyes:      Conjunctiva/sclera: Conjunctivae normal.   Cardiovascular:      Rate and Rhythm: Normal rate.   Pulmonary:      Effort: Pulmonary effort is normal. No respiratory distress.   Chest:   Breasts:     Right: No swelling, bleeding, mass, skin change or tenderness.      Left: No swelling, bleeding, mass, skin change or tenderness.          Comments: The breasts were examined in the supine and upright positions, and are overall symmetrical in appearance, with some edema of the left breast. The left breast has a well-healed lumpectomy incision with a mildly palpable underlying seroma. Otherwise, the remainder of the breast exam exhibits normal breast tissue in the right and left breasts, without any discrete firmness, nodularity, or lesions to palpation. There is no other tenderness or palpable masses. The overlying skin is otherwise normal. There is no appreciable axillary lymphadenopathy in the right or left axilla.   Abdominal:      General: There is no distension.   Musculoskeletal:         General: Normal  range of motion.      Cervical back: Normal range of motion and neck supple.   Lymphadenopathy:      Upper Body:      Right upper body: No axillary adenopathy.      Left upper body: No axillary adenopathy.   Skin:     General: Skin is warm and dry.   Neurological:      Mental Status: She is alert and oriented to person, place, and time.   Psychiatric:         Mood and Affect: Mood normal.         Behavior: Behavior normal.         Laboratory Review:  There are no laboratory contraindications to radiation therapy.    The pertinent lab results were reviewed and discussed with the patient.  Notably, per HPI      Pathology Review:  The pertinent pathology results were reviewed and discussed with the patient.  Notably, per HPI     Imaging:  The pertinent imaging results were reviewed and discussed with the patient.  Notably, per HPI       ASSESSMENT:  Ms. Smith is a 64 y.o. woman with a diagnosis of Infiltrating ductal carcinoma of upper-outer quadrant of left breast in female (Multi), Clinical: Stage IA (cT1, cN0, cM0, G2, ER+, NC+, HER2-)  Infiltrating ductal carcinoma of upper-outer quadrant of left breast in female (Multi), Pathologic: Stage IA (pT2, pN0(i+)(sn), cM0, G2, ER+, NC+, HER2-), status post lumpectomy and sentinel node biopsy, here for a discussion of adjuvant radiation treatment.      PLAN:  We had an extensive discussion regarding role of radiation in this setting.     We reviewed the standard of care for post-lumpectomy treatment for early stage breast cancer, which would include adjuvant radiotherapy per national guidelines. This would consist of hypofractionated radiotherapy, to a dose of 42.56 Gy in 16 fractions.     Given her close margins and larger tumor size, we would also consider a boost to the tumor bed of 10 Gy in 4 fractions, for a total dose of 52.56 Gy in 20 fractions, depending on our ability to visualize this location and meet treatment planning parameters. This would further decrease  her risk of in-breast recurrence. Although this effect is somewhat diminished in patients >age 60, given her performance status and high motivation for aggressive treatment, it could still be considered in this setting.    We discussed the acute side effects of therapies and potential late toxicities. During the course of radiation, acute side effects could include, but are not limited to fatigue, dermatitis, or chest wall discomfort. The typical timeline for the resolution of these effects as well as available supportive therapies were reviewed. Potential long term side effects can include, but are not limited to, fibrosis, increased heart disease risk, rib fractures, radiation pneumonitis, and a small risk of second malignancies. Given her left-sided disease, we will plan on treatment in the prone position, to help reduce incidental dose to the heart.    After the discussion, the patient was given the opportunity to ask questions which were subsequently answered, and our contact information was given.    Please contact our department with any further questions regarding the plan of management.    The length of the visit was 60 minutes. We spent more than 50% of this time discussing treatment options with the patient.    Recommendations:  - Whole breast radiation to a dose of 42.56 Gy in 16 fractions, followed by a 10 Gy in 4 fractions lumpectomy bed boost  - CT simulation in the coming weeks   NCCN Guidelines were applicable to guide this patients treatment plan.   Shaila Krueger DO

## 2024-04-16 ENCOUNTER — HOSPITAL ENCOUNTER (OUTPATIENT)
Dept: RADIATION ONCOLOGY | Facility: CLINIC | Age: 64
Setting detail: RADIATION/ONCOLOGY SERIES
Discharge: HOME | End: 2024-04-16
Payer: COMMERCIAL

## 2024-04-16 VITALS
RESPIRATION RATE: 16 BRPM | HEART RATE: 81 BPM | DIASTOLIC BLOOD PRESSURE: 90 MMHG | SYSTOLIC BLOOD PRESSURE: 161 MMHG | TEMPERATURE: 96.3 F | BODY MASS INDEX: 38.1 KG/M2 | OXYGEN SATURATION: 97 % | WEIGHT: 198.97 LBS

## 2024-04-16 DIAGNOSIS — Z17.0 MALIGNANT NEOPLASM OF UPPER-OUTER QUADRANT OF LEFT BREAST IN FEMALE, ESTROGEN RECEPTOR POSITIVE (MULTI): ICD-10-CM

## 2024-04-16 DIAGNOSIS — C50.412 MALIGNANT NEOPLASM OF UPPER-OUTER QUADRANT OF LEFT BREAST IN FEMALE, ESTROGEN RECEPTOR POSITIVE (MULTI): ICD-10-CM

## 2024-04-16 DIAGNOSIS — C50.412 INFILTRATING DUCTAL CARCINOMA OF UPPER-OUTER QUADRANT OF LEFT BREAST IN FEMALE (MULTI): Primary | ICD-10-CM

## 2024-04-16 LAB
CLINICAL SIG UPDATED INFO: NORMAL
LAB AP ASR DISCLAIMER: NORMAL
LAB AP BLOCK FOR ADDITIONAL STUDIES: NORMAL
LABORATORY COMMENT REPORT: NORMAL
PATH REPORT.FINAL DX SPEC: NORMAL
PATH REPORT.GROSS SPEC: NORMAL
PATH REPORT.RELEVANT HX SPEC: NORMAL
PATH REPORT.TOTAL CANCER: NORMAL
PATHOLOGY SYNOPTIC REPORT: NORMAL

## 2024-04-16 PROCEDURE — 99214 OFFICE O/P EST MOD 30 MIN: CPT | Performed by: STUDENT IN AN ORGANIZED HEALTH CARE EDUCATION/TRAINING PROGRAM

## 2024-04-16 PROCEDURE — 99204 OFFICE O/P NEW MOD 45 MIN: CPT | Performed by: STUDENT IN AN ORGANIZED HEALTH CARE EDUCATION/TRAINING PROGRAM

## 2024-04-16 SDOH — ECONOMIC STABILITY: TRANSPORTATION INSECURITY
IN THE PAST 12 MONTHS, HAS LACK OF TRANSPORTATION KEPT YOU FROM MEETINGS, WORK, OR FROM GETTING THINGS NEEDED FOR DAILY LIVING?: NO

## 2024-04-16 SDOH — ECONOMIC STABILITY: INCOME INSECURITY: IN THE LAST 12 MONTHS, WAS THERE A TIME WHEN YOU WERE NOT ABLE TO PAY THE MORTGAGE OR RENT ON TIME?: NO

## 2024-04-16 SDOH — ECONOMIC STABILITY: HOUSING INSECURITY
IN THE LAST 12 MONTHS, WAS THERE A TIME WHEN YOU DID NOT HAVE A STEADY PLACE TO SLEEP OR SLEPT IN A SHELTER (INCLUDING NOW)?: NO

## 2024-04-16 SDOH — ECONOMIC STABILITY: FOOD INSECURITY: WITHIN THE PAST 12 MONTHS, YOU WORRIED THAT YOUR FOOD WOULD RUN OUT BEFORE YOU GOT MONEY TO BUY MORE.: NEVER TRUE

## 2024-04-16 SDOH — ECONOMIC STABILITY: FOOD INSECURITY: WITHIN THE PAST 12 MONTHS, THE FOOD YOU BOUGHT JUST DIDN'T LAST AND YOU DIDN'T HAVE MONEY TO GET MORE.: NEVER TRUE

## 2024-04-16 SDOH — ECONOMIC STABILITY: TRANSPORTATION INSECURITY
IN THE PAST 12 MONTHS, HAS THE LACK OF TRANSPORTATION KEPT YOU FROM MEDICAL APPOINTMENTS OR FROM GETTING MEDICATIONS?: NO

## 2024-04-16 ASSESSMENT — COLUMBIA-SUICIDE SEVERITY RATING SCALE - C-SSRS
2. HAVE YOU ACTUALLY HAD ANY THOUGHTS OF KILLING YOURSELF?: NO
6. HAVE YOU EVER DONE ANYTHING, STARTED TO DO ANYTHING, OR PREPARED TO DO ANYTHING TO END YOUR LIFE?: NO
1. IN THE PAST MONTH, HAVE YOU WISHED YOU WERE DEAD OR WISHED YOU COULD GO TO SLEEP AND NOT WAKE UP?: NO

## 2024-04-16 ASSESSMENT — LIFESTYLE VARIABLES
HOW OFTEN DO YOU HAVE A DRINK CONTAINING ALCOHOL: 2-4 TIMES A MONTH
HOW OFTEN DO YOU HAVE SIX OR MORE DRINKS ON ONE OCCASION: NEVER
AUDIT-C TOTAL SCORE: 2
SKIP TO QUESTIONS 9-10: 1
HOW MANY STANDARD DRINKS CONTAINING ALCOHOL DO YOU HAVE ON A TYPICAL DAY: 1 OR 2

## 2024-04-16 ASSESSMENT — ENCOUNTER SYMPTOMS
LOSS OF SENSATION IN FEET: 0
OCCASIONAL FEELINGS OF UNSTEADINESS: 0
DEPRESSION: 0

## 2024-04-16 ASSESSMENT — SOCIAL DETERMINANTS OF HEALTH (SDOH)
WITHIN THE LAST YEAR, HAVE YOU BEEN AFRAID OF YOUR PARTNER OR EX-PARTNER?: NO
WITHIN THE LAST YEAR, HAVE YOU BEEN KICKED, HIT, SLAPPED, OR OTHERWISE PHYSICALLY HURT BY YOUR PARTNER OR EX-PARTNER?: NO
WITHIN THE LAST YEAR, HAVE TO BEEN RAPED OR FORCED TO HAVE ANY KIND OF SEXUAL ACTIVITY BY YOUR PARTNER OR EX-PARTNER?: NO
WITHIN THE LAST YEAR, HAVE YOU BEEN HUMILIATED OR EMOTIONALLY ABUSED IN OTHER WAYS BY YOUR PARTNER OR EX-PARTNER?: NO
HOW HARD IS IT FOR YOU TO PAY FOR THE VERY BASICS LIKE FOOD, HOUSING, MEDICAL CARE, AND HEATING?: NOT HARD AT ALL

## 2024-04-16 ASSESSMENT — PAIN SCALES - GENERAL: PAINLEVEL: 0-NO PAIN

## 2024-04-16 NOTE — PROGRESS NOTES
Radiation Oncology Nursing Note    Prior Radiotherapy:  No  Radiation Treatments       No radiation treatments to show. (Treatments may have been administered in another system.)          Current Systemic Treatment:  No     Presence of Pacemaker or ICD:  No    History of Autoimmune or Connective Tissue Disorders:  No    Pain: The patient's current pain level was assessed.  They report currently having a pain of 0 out of 10.  They feel their pain is under control without the use of pain medications.    Review of Systems:  Review of Systems - Oncology

## 2024-04-16 NOTE — PROGRESS NOTES
Patient is in today for consult visit. Her lumpectomy was 3/27/24. Today she denies pain and post operative infection. She does reprot numbness on backside of her arm and full ROM. She will see Golden 4/18/24 and  in Jn 2025 with mammogram. Prior to appointment ending the patient was given written and verbal education on radiation therapy, side effects, CT simulation, and how to contact this office. Verbalized understanding. No further concerns for nursing at this time.

## 2024-04-16 NOTE — ADDENDUM NOTE
Encounter addended by: Danna Kimbrough RN on: 4/16/2024 3:24 PM   Actions taken: Clinical Note Signed

## 2024-04-18 ENCOUNTER — OFFICE VISIT (OUTPATIENT)
Dept: HEMATOLOGY/ONCOLOGY | Facility: HOSPITAL | Age: 64
End: 2024-04-18
Payer: COMMERCIAL

## 2024-04-18 VITALS
HEART RATE: 70 BPM | WEIGHT: 197.97 LBS | DIASTOLIC BLOOD PRESSURE: 81 MMHG | BODY MASS INDEX: 37.38 KG/M2 | SYSTOLIC BLOOD PRESSURE: 138 MMHG | HEIGHT: 61 IN | OXYGEN SATURATION: 95 % | TEMPERATURE: 97.9 F | RESPIRATION RATE: 20 BRPM

## 2024-04-18 DIAGNOSIS — M85.80 OSTEOPENIA, UNSPECIFIED LOCATION: ICD-10-CM

## 2024-04-18 DIAGNOSIS — Z17.0 MALIGNANT NEOPLASM OF UPPER-OUTER QUADRANT OF LEFT BREAST IN FEMALE, ESTROGEN RECEPTOR POSITIVE (MULTI): ICD-10-CM

## 2024-04-18 DIAGNOSIS — C50.412 MALIGNANT NEOPLASM OF UPPER-OUTER QUADRANT OF LEFT BREAST IN FEMALE, ESTROGEN RECEPTOR POSITIVE (MULTI): ICD-10-CM

## 2024-04-18 DIAGNOSIS — Z17.0 ER+ (ESTROGEN RECEPTOR POSITIVE STATUS): Primary | ICD-10-CM

## 2024-04-18 PROCEDURE — 1159F MED LIST DOCD IN RCRD: CPT | Performed by: INTERNAL MEDICINE

## 2024-04-18 PROCEDURE — 1036F TOBACCO NON-USER: CPT | Performed by: INTERNAL MEDICINE

## 2024-04-18 PROCEDURE — 99215 OFFICE O/P EST HI 40 MIN: CPT | Performed by: INTERNAL MEDICINE

## 2024-04-18 PROCEDURE — 99205 OFFICE O/P NEW HI 60 MIN: CPT | Performed by: INTERNAL MEDICINE

## 2024-04-18 PROCEDURE — 1160F RVW MEDS BY RX/DR IN RCRD: CPT | Performed by: INTERNAL MEDICINE

## 2024-04-18 PROCEDURE — 1126F AMNT PAIN NOTED NONE PRSNT: CPT | Performed by: INTERNAL MEDICINE

## 2024-04-18 ASSESSMENT — PAIN SCALES - GENERAL: PAINLEVEL: 0-NO PAIN

## 2024-04-18 NOTE — PROGRESS NOTES
DIVISION OF MEDICAL ONCOLOGY    Patient ID: Nidia Smith is a 64 y.o. female.  MRN: [id number]  : 1960  The patient presents to clinic today for her history of left-sided breast cancer.     Cancer Staging   Infiltrating ductal carcinoma of upper-outer quadrant of left breast in female (Multi)  Staging form: Breast, AJCC 8th Edition  - Clinical stage from 2024: Stage IA (cT1, cN0, cM0, G2, ER+, OK+, HER2-) - Unsigned  - Pathologic stage from 3/27/2024: Stage IA (pT2, pN0(i+)(sn), cM0, G2, ER+, OK+, HER2-) - Unsigned    Diagnostic/Therapeutic History:  - 24: Bilateral screening mammogram showed a spiculated mass in the left breast.  - 1/15/24: Diagnostic ultrasound confirmed a 15.9 mm hypoechoic solid mass at 2:00, 12 cm FN, within the left breat. On 24, axillary LN's appeared normal on repeat US.  - 24: US-guided CNB showed IDC, grade 2, ER >95% OK 75% Her2-negative (1+).  - 3/27/24: Left lumpectomy and SLNBx performed. 3.0 cm of grade 2 IDC removed, no LVI, margins negative. Of 3 LN's removed, 1 contained ITC's. No TITO.    History of Present Illness (HPI)/Interval History:  Nidia Smith presents today for initial evaluation and discussion of her adjuvant systemic therapy options.  Her oncologic history has been summarized in detail above.  She has been feeling well since her surgery.  No breast pain, lymphedema, dyspnea, cough, abdominal pain.  ROM of left arm normal.  She has met with Dr. Krueger from Radiation Oncology- plans to proceed with adjuvant RT once systemic therapy plans are finalized.    Review of Systems:  14-point ROS otherwise negative, as per HPI/Interval History.    Past Medical History:   Diagnosis Date    Breast cancer (Multi)     Disease of thyroid gland      Patient Active Problem List   Diagnosis    Other specified hypothyroidism    Hypercholesterolemia    Current mild episode of major depressive disorder without prior episode (CMS-HCC)    Gastroesophageal reflux disease  without esophagitis    Mass of upper outer quadrant of left breast    Abnormal finding on breast imaging    Other hyperlipidemia    Obesity with body mass index 30 or greater    Depression with anxiety    Accidental fall    Accessory skin tags    Postmenopausal state    Encounter for screening mammogram for malignant neoplasm of breast    Chronic GERD    Infiltrating ductal carcinoma of upper-outer quadrant of left breast in female (Multi)    Malignant neoplasm of left breast in female, estrogen receptor positive (Multi)        Past Surgical History:   Procedure Laterality Date    BREAST LUMPECTOMY      COLONOSCOPY      CYST REMOVAL      ON NECK       Social History     Socioeconomic History    Marital status:      Spouse name: Not on file    Number of children: Not on file    Years of education: Not on file    Highest education level: Not on file   Occupational History    Not on file   Tobacco Use    Smoking status: Never     Passive exposure: Never    Smokeless tobacco: Never   Vaping Use    Vaping status: Never Used   Substance and Sexual Activity    Alcohol use: Not Currently     Comment: rarely    Drug use: Never    Sexual activity: Defer   Other Topics Concern    Not on file   Social History Narrative    Not on file     Social Determinants of Health     Financial Resource Strain: Low Risk  (4/16/2024)    Overall Financial Resource Strain (CARDIA)     Difficulty of Paying Living Expenses: Not hard at all   Food Insecurity: No Food Insecurity (4/16/2024)    Hunger Vital Sign     Worried About Running Out of Food in the Last Year: Never true     Ran Out of Food in the Last Year: Never true   Transportation Needs: No Transportation Needs (4/16/2024)    PRAPARE - Transportation     Lack of Transportation (Medical): No     Lack of Transportation (Non-Medical): No   Physical Activity: Not on file   Stress: No Stress Concern Present (4/16/2024)    [employer] - Occupational Stress  "Questionnaire     Feeling of Stress : Only a little   Social Connections: Not on file   Intimate Partner Violence: Not At Risk (2024)    Humiliation, Afraid, Rape, and Kick questionnaire     Fear of Current or Ex-Partner: No     Emotionally Abused: No     Physically Abused: No     Sexually Abused: No   Housing Stability: Unknown (2024)    Housing Stability Vital Sign     Unable to Pay for Housing in the Last Year: No     Number of Places Lived in the Last Year: Not on file     Unstable Housing in the Last Year: No       Family History   Problem Relation Name Age of Onset    Skin cancer Mother      Coronary artery disease Father      Uterine cancer Sister      Uterine cancer Paternal Grandmother         Allergies:   No Known Allergies      Current Outpatient Medications:     atorvastatin (Lipitor) 40 mg tablet, Take 1 tablet (40 mg) by mouth once daily., Disp: , Rfl:     levothyroxine (Synthroid, Levoxyl) 125 mcg tablet, Take 1 tablet (125 mcg) by mouth once daily., Disp: , Rfl:     sertraline (Zoloft) 100 mg tablet, TABLET BY MOUTH EVERY DAY, Disp: , Rfl:     traMADol (Ultram) 50 mg tablet, Take 1 tablet (50 mg) by mouth every 8 hours if needed for severe pain (7 - 10)., Disp: 15 tablet, Rfl: 0     Objective    BSA: 1.96 meters squared  /81   Pulse 70   Temp 36.6 °C (97.9 °F) (Temporal)   Resp 20   Ht (S) 1.545 m (5' 0.83\")   Wt 89.8 kg (197 lb 15.6 oz)   SpO2 95%   BMI 37.62 kg/m²   Wt Readings from Last 3 Encounters:   24 89.8 kg (197 lb 15.6 oz)   24 90.2 kg (198 lb 15.4 oz)   24 90.3 kg (199 lb)     ECOG Performance Status:  The ECOG performance scale today is ECO- Fully active, able to carry on all pre-disease performance w/o restriction.    Physical Exam  Vitals reviewed.   Constitutional:       General: She is not in acute distress.     Appearance: Normal appearance. She is not ill-appearing.   HENT:      Head: Normocephalic and atraumatic.   Eyes:      General: No " scleral icterus.  Cardiovascular:      Rate and Rhythm: Normal rate and regular rhythm.      Heart sounds: No murmur heard.  Pulmonary:      Effort: Pulmonary effort is normal. No respiratory distress.      Breath sounds: Normal breath sounds.   Musculoskeletal:      Cervical back: Normal range of motion and neck supple. No rigidity or tenderness.   Lymphadenopathy:      Cervical: No cervical adenopathy.   Skin:     General: Skin is warm and dry.      Coloration: Skin is not jaundiced.      Findings: No rash.   Neurological:      General: No focal deficit present.      Mental Status: She is alert and oriented to person, place, and time. Mental status is at baseline.      Gait: Gait normal.   Psychiatric:         Mood and Affect: Mood normal.         Behavior: Behavior normal.         Thought Content: Thought content normal.         Judgment: Judgment normal.         Laboratory Data:  Lab Results   Component Value Date    WBC 10.2 03/20/2024    HGB 12.9 03/20/2024    HCT 40.1 03/20/2024    MCV 98 03/20/2024     03/20/2024       Chemistry    Lab Results   Component Value Date/Time     03/20/2024 1501    K 4.3 03/20/2024 1501     03/20/2024 1501    CO2 26 03/20/2024 1501    BUN 16 03/20/2024 1501    CREATININE 1.20 03/20/2024 1501    Lab Results   Component Value Date/Time    CALCIUM 9.6 03/20/2024 1501    ALKPHOS 77 01/12/2024 0858    AST 19 01/12/2024 0858    ALT 15 01/12/2024 0858    BILITOT 0.8 01/12/2024 0858        Radiology:  STUDY:  BI US BREAST LIMITED LEFT;  2/5/2024 9:34 am      ACCESSION NUMBER(S):  [id number]      ORDERING CLINICIAN:  JOSELO ZUNIGA      INDICATION:  Preprocedural ultrasound of a left breast mass and a clinician  detected palpable left axillary lymph node.      COMPARISON:  Breast ultrasound 01/15/2024. Mammogram 01/12/2024.      FINDINGS:  A targeted ultrasound of the left breast and axilla was performed by  a registered sonographer using elastography.      An  irregular hypoechoic mass with posterior shadowing is seen at the  2 o'clock position 12 cm from the nipple. The mass measures 1.4 x 1.0  x 1.5 cm. It demonstrates internal vascularity and intermediate  stiffness on elastography.      A targeted ultrasound of the left axilla demonstrates 3  morphologically normal axillary lymph nodes. No suspicious masses or  lymph nodes are visualized in the area of the reported clinician  detected palpable left axillary lump.      IMPRESSION:  Suspicious left breast mass without axillary lymphadenopathy. A  biopsy of the breast mass will subsequently be performed today.    Pathology:  Surgical Pathology Exam: [id number]  Order: [id number]   Collected 3/27/2024 12:16       Status: Edited Result - FINAL       Visible to patient: Yes (seen)       Dx: Malignant neoplasm of left breast in ...    0 Result Notes       2 Follow-up Encounters       Component  Resulting Agency   FINAL DIAGNOSIS      A. LEFT BREAST, 2:00, LUMPECTOMY:              Infiltrating ductal carcinoma, grade 2, 3.0 cm, see comment.              Ductal carcinoma in situ, solid and cribriform types, intermediate to high nuclear grade, with necrosis.              Findings consistent with previous biopsy site.              Columnar alteration of lobular units, apocrine change and microcalcifications.     Comment: The immunohistochemical stain findings for E-cadherin, p63 and SMMHC support the above diagnosis.     B. LEFT BREAST, 2:00, ANTERIOR, LUMPECTOMY:              Isolated tumor cells in 1 of 2 intramammary lymph nodes, see comment.     Comment: The submitted tissue includes two intramammary lymph nodes (one with isolated tumor cells).     C. LEFT AXILLARY TISSUE, EXCISION:              Fibroadipose tissue, negative for malignancy, see comment.     Comment: Lymph node tissue is not seen in the sections examined.     D. LEFT AXILLARY SENTINEL LYMPH NODE (1), EXCISION:              Negative for malignancy.             Assessment/Plan:  Nidia Smith is a 64 y.o. female with a history of left-sided stage IA breast cancer, who presents today for initial evaluation and discussion of her adjuvant systemic therapy options.    #Left breast cancer. Stage IA (pT2 pN0(i+) cM0), grade 2 IDC. ER/NV+, Her2-negative.  Today, we discussed the natural history of breast cancer and potential treatment options.   Discussed Oncotype Dx as standard genomic test to determine recurrence risk and predict chemotherapy benefit. This test has been ordered by Dr. Sanchez; expect results in ~2 weeks.  - If Oncotype Dx RS 26+, will recommend adjuvant TC (docetaxel, cyclophosphamide) x4 cycles. Potential toxicities discussed.  - If Oncotype Dx RS 0-25, will not recommend chemotherapy.  - She has me with Dr. Krueger with Radiation Oncology; plans to proceed with adjuvant RT.  - We reviewed the role of adjuvant endocrine therapy in her case. No matter what genomic testing reveals, I do recommend at least 5 years of an aromatase inhibitor in order to reduce her risk of cancer recurrence, decrease the chance of a new breast cancer, and improve survival. Potential toxicities of AI therapy were discussed, and she is agreeable to proceed with this treatment in the future.  - Also discussed role of adjuvant Zometa. She will consider this.  - Will call with Oncotype results and determine next steps.    #Osteopenia. Noted on DEXA from 1/2024.  - On Ca/VitD supplementation.  - Consider Zometa, as above.    Disposition.  - RTC 2-3 months at [place] location.  - She was given our contact information and instructed to call with concerns/questions in the interim.    Jason Franks MD  Hematology and Medical Oncology  [facility]

## 2024-05-06 ENCOUNTER — HOSPITAL ENCOUNTER (OUTPATIENT)
Dept: RADIOLOGY | Facility: EXTERNAL LOCATION | Age: 64
Discharge: HOME | End: 2024-05-06
Payer: COMMERCIAL

## 2024-05-06 DIAGNOSIS — Z17.0 MALIGNANT NEOPLASM OF UPPER-OUTER QUADRANT OF LEFT BREAST IN FEMALE, ESTROGEN RECEPTOR POSITIVE (MULTI): ICD-10-CM

## 2024-05-06 DIAGNOSIS — C50.412 MALIGNANT NEOPLASM OF UPPER-OUTER QUADRANT OF LEFT BREAST IN FEMALE, ESTROGEN RECEPTOR POSITIVE (MULTI): ICD-10-CM

## 2024-05-07 ENCOUNTER — HOSPITAL ENCOUNTER (OUTPATIENT)
Dept: RADIATION ONCOLOGY | Facility: CLINIC | Age: 64
Setting detail: RADIATION/ONCOLOGY SERIES
Discharge: HOME | End: 2024-05-07
Payer: COMMERCIAL

## 2024-05-07 PROCEDURE — 77332 RADIATION TREATMENT AID(S): CPT | Performed by: STUDENT IN AN ORGANIZED HEALTH CARE EDUCATION/TRAINING PROGRAM

## 2024-05-07 PROCEDURE — 77263 THER RADIOLOGY TX PLNG CPLX: CPT | Performed by: STUDENT IN AN ORGANIZED HEALTH CARE EDUCATION/TRAINING PROGRAM

## 2024-05-07 PROCEDURE — 77290 THER RAD SIMULAJ FIELD CPLX: CPT | Performed by: STUDENT IN AN ORGANIZED HEALTH CARE EDUCATION/TRAINING PROGRAM

## 2024-05-21 ENCOUNTER — HOSPITAL ENCOUNTER (OUTPATIENT)
Dept: RADIATION ONCOLOGY | Facility: CLINIC | Age: 64
Setting detail: RADIATION/ONCOLOGY SERIES
Discharge: HOME | End: 2024-05-21
Payer: COMMERCIAL

## 2024-05-21 PROCEDURE — 77300 RADIATION THERAPY DOSE PLAN: CPT | Performed by: STUDENT IN AN ORGANIZED HEALTH CARE EDUCATION/TRAINING PROGRAM

## 2024-05-21 PROCEDURE — 77295 3-D RADIOTHERAPY PLAN: CPT | Performed by: STUDENT IN AN ORGANIZED HEALTH CARE EDUCATION/TRAINING PROGRAM

## 2024-05-21 PROCEDURE — 77334 RADIATION TREATMENT AID(S): CPT | Performed by: STUDENT IN AN ORGANIZED HEALTH CARE EDUCATION/TRAINING PROGRAM

## 2024-05-28 ENCOUNTER — HOSPITAL ENCOUNTER (OUTPATIENT)
Dept: RADIATION ONCOLOGY | Facility: CLINIC | Age: 64
Setting detail: RADIATION/ONCOLOGY SERIES
Discharge: HOME | End: 2024-05-28
Payer: COMMERCIAL

## 2024-05-28 DIAGNOSIS — C50.412 MALIGNANT NEOPLASM OF UPPER-OUTER QUADRANT OF LEFT FEMALE BREAST (MULTI): ICD-10-CM

## 2024-05-28 DIAGNOSIS — Z51.0 ENCOUNTER FOR ANTINEOPLASTIC RADIATION THERAPY: ICD-10-CM

## 2024-05-28 DIAGNOSIS — C77.3 SECONDARY AND UNSPECIFIED MALIGNANT NEOPLASM OF AXILLA AND UPPER LIMB LYMPH NODES (MULTI): ICD-10-CM

## 2024-05-28 LAB
RAD ONC MSQ ACTUAL FRACTIONS DELIVERED: 1
RAD ONC MSQ ACTUAL SESSION DELIVERED DOSE: 266 CGRAY
RAD ONC MSQ ACTUAL TOTAL DOSE: 266 CGRAY
RAD ONC MSQ ELAPSED DAYS: 0
RAD ONC MSQ LAST DATE: NORMAL
RAD ONC MSQ PRESCRIBED FRACTIONAL DOSE: 266 CGRAY
RAD ONC MSQ PRESCRIBED NUMBER OF FRACTIONS: 16
RAD ONC MSQ PRESCRIBED TECHNIQUE: NORMAL
RAD ONC MSQ PRESCRIBED TOTAL DOSE: 4256 CGRAY
RAD ONC MSQ START DATE: NORMAL
RAD ONC MSQ TREATMENT COURSE NUMBER: 1
RAD ONC MSQ TREATMENT SITE: NORMAL

## 2024-05-28 PROCEDURE — 77412 RADIATION TX DELIVERY LVL 3: CPT | Performed by: STUDENT IN AN ORGANIZED HEALTH CARE EDUCATION/TRAINING PROGRAM

## 2024-05-28 PROCEDURE — 77336 RADIATION PHYSICS CONSULT: CPT | Mod: 59 | Performed by: STUDENT IN AN ORGANIZED HEALTH CARE EDUCATION/TRAINING PROGRAM

## 2024-05-28 PROCEDURE — 77280 THER RAD SIMULAJ FIELD SMPL: CPT | Performed by: STUDENT IN AN ORGANIZED HEALTH CARE EDUCATION/TRAINING PROGRAM

## 2024-05-29 ENCOUNTER — HOSPITAL ENCOUNTER (OUTPATIENT)
Dept: RADIATION ONCOLOGY | Facility: CLINIC | Age: 64
Setting detail: RADIATION/ONCOLOGY SERIES
Discharge: HOME | End: 2024-05-29
Payer: COMMERCIAL

## 2024-05-29 DIAGNOSIS — C77.3 SECONDARY AND UNSPECIFIED MALIGNANT NEOPLASM OF AXILLA AND UPPER LIMB LYMPH NODES (MULTI): ICD-10-CM

## 2024-05-29 DIAGNOSIS — Z51.0 ENCOUNTER FOR ANTINEOPLASTIC RADIATION THERAPY: ICD-10-CM

## 2024-05-29 DIAGNOSIS — C50.412 MALIGNANT NEOPLASM OF UPPER-OUTER QUADRANT OF LEFT FEMALE BREAST (MULTI): ICD-10-CM

## 2024-05-29 LAB
RAD ONC MSQ ACTUAL FRACTIONS DELIVERED: 2
RAD ONC MSQ ACTUAL SESSION DELIVERED DOSE: 266 CGRAY
RAD ONC MSQ ACTUAL TOTAL DOSE: 532 CGRAY
RAD ONC MSQ ELAPSED DAYS: 1
RAD ONC MSQ LAST DATE: NORMAL
RAD ONC MSQ PRESCRIBED FRACTIONAL DOSE: 266 CGRAY
RAD ONC MSQ PRESCRIBED NUMBER OF FRACTIONS: 16
RAD ONC MSQ PRESCRIBED TECHNIQUE: NORMAL
RAD ONC MSQ PRESCRIBED TOTAL DOSE: 4256 CGRAY
RAD ONC MSQ START DATE: NORMAL
RAD ONC MSQ TREATMENT COURSE NUMBER: 1
RAD ONC MSQ TREATMENT SITE: NORMAL

## 2024-05-29 PROCEDURE — 77014 CHG CT GUIDANCE RADIATION THERAPY FLDS PLACEMENT: CPT | Performed by: STUDENT IN AN ORGANIZED HEALTH CARE EDUCATION/TRAINING PROGRAM

## 2024-05-29 PROCEDURE — 77387 GUIDANCE FOR RADJ TX DLVR: CPT | Performed by: STUDENT IN AN ORGANIZED HEALTH CARE EDUCATION/TRAINING PROGRAM

## 2024-05-29 PROCEDURE — 77412 RADIATION TX DELIVERY LVL 3: CPT | Performed by: STUDENT IN AN ORGANIZED HEALTH CARE EDUCATION/TRAINING PROGRAM

## 2024-05-30 ENCOUNTER — HOSPITAL ENCOUNTER (OUTPATIENT)
Dept: RADIATION ONCOLOGY | Facility: CLINIC | Age: 64
Setting detail: RADIATION/ONCOLOGY SERIES
Discharge: HOME | End: 2024-05-30
Payer: COMMERCIAL

## 2024-05-30 VITALS
DIASTOLIC BLOOD PRESSURE: 76 MMHG | TEMPERATURE: 97 F | BODY MASS INDEX: 36.91 KG/M2 | RESPIRATION RATE: 16 BRPM | SYSTOLIC BLOOD PRESSURE: 134 MMHG | HEART RATE: 74 BPM | WEIGHT: 194.22 LBS | OXYGEN SATURATION: 97 %

## 2024-05-30 DIAGNOSIS — C50.412 MALIGNANT NEOPLASM OF UPPER-OUTER QUADRANT OF LEFT FEMALE BREAST (MULTI): ICD-10-CM

## 2024-05-30 DIAGNOSIS — Z51.0 ENCOUNTER FOR ANTINEOPLASTIC RADIATION THERAPY: ICD-10-CM

## 2024-05-30 DIAGNOSIS — C77.3 SECONDARY AND UNSPECIFIED MALIGNANT NEOPLASM OF AXILLA AND UPPER LIMB LYMPH NODES (MULTI): ICD-10-CM

## 2024-05-30 LAB
RAD ONC MSQ ACTUAL FRACTIONS DELIVERED: 3
RAD ONC MSQ ACTUAL SESSION DELIVERED DOSE: 266 CGRAY
RAD ONC MSQ ACTUAL TOTAL DOSE: 798 CGRAY
RAD ONC MSQ ELAPSED DAYS: 2
RAD ONC MSQ LAST DATE: NORMAL
RAD ONC MSQ PRESCRIBED FRACTIONAL DOSE: 266 CGRAY
RAD ONC MSQ PRESCRIBED NUMBER OF FRACTIONS: 16
RAD ONC MSQ PRESCRIBED TECHNIQUE: NORMAL
RAD ONC MSQ PRESCRIBED TOTAL DOSE: 4256 CGRAY
RAD ONC MSQ START DATE: NORMAL
RAD ONC MSQ TREATMENT COURSE NUMBER: 1
RAD ONC MSQ TREATMENT SITE: NORMAL

## 2024-05-30 PROCEDURE — 77014 CHG CT GUIDANCE RADIATION THERAPY FLDS PLACEMENT: CPT | Performed by: STUDENT IN AN ORGANIZED HEALTH CARE EDUCATION/TRAINING PROGRAM

## 2024-05-30 PROCEDURE — 77387 GUIDANCE FOR RADJ TX DLVR: CPT | Performed by: STUDENT IN AN ORGANIZED HEALTH CARE EDUCATION/TRAINING PROGRAM

## 2024-05-30 PROCEDURE — 77412 RADIATION TX DELIVERY LVL 3: CPT | Performed by: STUDENT IN AN ORGANIZED HEALTH CARE EDUCATION/TRAINING PROGRAM

## 2024-05-30 PROCEDURE — 77427 RADIATION TX MANAGEMENT X5: CPT | Performed by: STUDENT IN AN ORGANIZED HEALTH CARE EDUCATION/TRAINING PROGRAM

## 2024-05-30 ASSESSMENT — ENCOUNTER SYMPTOMS
OCCASIONAL FEELINGS OF UNSTEADINESS: 0
LOSS OF SENSATION IN FEET: 0
DEPRESSION: 0

## 2024-05-30 ASSESSMENT — PAIN SCALES - GENERAL: PAINLEVEL: 0-NO PAIN

## 2024-05-30 ASSESSMENT — PATIENT HEALTH QUESTIONNAIRE - PHQ9
1. LITTLE INTEREST OR PLEASURE IN DOING THINGS: NOT AT ALL
2. FEELING DOWN, DEPRESSED OR HOPELESS: NOT AT ALL
SUM OF ALL RESPONSES TO PHQ9 QUESTIONS 1 AND 2: 0

## 2024-05-30 NOTE — PROGRESS NOTES
Radiation Oncology On Treatment Visit    Patient Name:  Nidia Smith  MRN:  [id number]  :  1960    Referring Provider: Barb Sanchez MD  Primary Care Provider: Jason Irby MD  Care Team: Patient Care Team:  Jason Irby MD as PCP - General (Family Medicine)  Jason Irby MD as PCP - [facility] PCP  Jason Irby MD as Primary Care Provider  Jason Franks MD as Consulting Physician (Hematology and Oncology)    Date of Service: 2024     Diagnosis:   Specialty Problems          Radiation Oncology Problems    Infiltrating ductal carcinoma of upper-outer quadrant of left breast in female (Multi)        Malignant neoplasm of left breast in female, estrogen receptor positive (Multi)         Treatment Summary:  3D CRT: Left Upper outer quadrant of breast    Treatment Period Technique Fraction Dose Fractions Total Dose   Course 1 2024-2024  (days elapsed: 1)         L breast 2024-2024 3D 266 / 266 cGy  532 / 4,256 cGy     SUBJECTIVE: Just started treatment and tolerating well. Denies any breast pain or discomfort. Using skin cream. Denies any fatigue.      OBJECTIVE:   Vital Signs:  /76 (BP Location: Left arm, Patient Position: Sitting, BP Cuff Size: Large adult)   Pulse 74   Temp 36.1 °C (97 °F) (Temporal)   Resp 16   Wt 88.1 kg (194 lb 3.6 oz)   SpO2 97%   BMI 36.91 kg/m²    Pain Scale: The patient's current pain level was assessed.  They report currently having a pain of 0 out of 10.    Other Pertinent Findings:     Toxicity Assessment          2024    15:04   Toxicity Assessment   Treatment Site Breast   Anorexia Grade 0   Anxiety Grade 0   Dehydration Grade 0   Depression Grade 0   Dermatitis Radiation Grade 0       Patient instructed to use Udderly Smooth BID   Diarrhea Grade 0   Fatigue Grade 0   Fibrosis Deep Connective Tissue Grade 0   Fracture Grade 0   Nausea Grade 0   Pain Grade 0   Treatment Related Secondary Malignancy Grade 0   Tumor Pain  Grade 0   Vomiting Grade 0   Abdominal Pain Grade 0   Dysphagia Grade 0   Esophagitis Grade 0   Gastric Hemorrhage Grade 0   Mucositis Oral Grade 0   Dry Mouth Grade 0   Breast Infection Grade 0   Seroma Grade 0   Joint Range of Motion Decreased Grade 0   Joint Range of Motion Decreased Lumbar Spine Grade 0   Brachial Plexopathy Grade 0   Breast Atrophy Grade 0   Breast Pain Grade 0   Nipple Deformity Grade 0   Pneumonitis Grade 0   Edema Limbs Grade 0   Lymphedema Grade 0   Thromboembolic Event Grade 0   Hot Flashes Grade 1       very rare        Assessment / Plan:  The patient is tolerating radiation therapy as anticipated.  Continue per current treatment plan.

## 2024-05-31 ENCOUNTER — HOSPITAL ENCOUNTER (OUTPATIENT)
Dept: RADIATION ONCOLOGY | Facility: CLINIC | Age: 64
Setting detail: RADIATION/ONCOLOGY SERIES
Discharge: HOME | End: 2024-05-31
Payer: COMMERCIAL

## 2024-05-31 DIAGNOSIS — C50.412 MALIGNANT NEOPLASM OF UPPER-OUTER QUADRANT OF LEFT FEMALE BREAST (MULTI): ICD-10-CM

## 2024-05-31 DIAGNOSIS — Z51.0 ENCOUNTER FOR ANTINEOPLASTIC RADIATION THERAPY: ICD-10-CM

## 2024-05-31 DIAGNOSIS — C77.3 SECONDARY AND UNSPECIFIED MALIGNANT NEOPLASM OF AXILLA AND UPPER LIMB LYMPH NODES (MULTI): ICD-10-CM

## 2024-05-31 LAB
RAD ONC MSQ ACTUAL FRACTIONS DELIVERED: 4
RAD ONC MSQ ACTUAL SESSION DELIVERED DOSE: 266 CGRAY
RAD ONC MSQ ACTUAL TOTAL DOSE: 1064 CGRAY
RAD ONC MSQ ELAPSED DAYS: 3
RAD ONC MSQ LAST DATE: NORMAL
RAD ONC MSQ PRESCRIBED FRACTIONAL DOSE: 266 CGRAY
RAD ONC MSQ PRESCRIBED NUMBER OF FRACTIONS: 16
RAD ONC MSQ PRESCRIBED TECHNIQUE: NORMAL
RAD ONC MSQ PRESCRIBED TOTAL DOSE: 4256 CGRAY
RAD ONC MSQ START DATE: NORMAL
RAD ONC MSQ TREATMENT COURSE NUMBER: 1
RAD ONC MSQ TREATMENT SITE: NORMAL

## 2024-05-31 PROCEDURE — 77412 RADIATION TX DELIVERY LVL 3: CPT | Performed by: STUDENT IN AN ORGANIZED HEALTH CARE EDUCATION/TRAINING PROGRAM

## 2024-05-31 PROCEDURE — 77014 CHG CT GUIDANCE RADIATION THERAPY FLDS PLACEMENT: CPT | Performed by: STUDENT IN AN ORGANIZED HEALTH CARE EDUCATION/TRAINING PROGRAM

## 2024-05-31 PROCEDURE — 77387 GUIDANCE FOR RADJ TX DLVR: CPT | Performed by: STUDENT IN AN ORGANIZED HEALTH CARE EDUCATION/TRAINING PROGRAM

## 2024-06-03 ENCOUNTER — HOSPITAL ENCOUNTER (OUTPATIENT)
Dept: RADIATION ONCOLOGY | Facility: CLINIC | Age: 64
Setting detail: RADIATION/ONCOLOGY SERIES
Discharge: HOME | End: 2024-06-03
Payer: COMMERCIAL

## 2024-06-03 DIAGNOSIS — C50.412 MALIGNANT NEOPLASM OF UPPER-OUTER QUADRANT OF LEFT FEMALE BREAST (MULTI): ICD-10-CM

## 2024-06-03 DIAGNOSIS — C77.3 SECONDARY AND UNSPECIFIED MALIGNANT NEOPLASM OF AXILLA AND UPPER LIMB LYMPH NODES (MULTI): ICD-10-CM

## 2024-06-03 DIAGNOSIS — Z51.0 ENCOUNTER FOR ANTINEOPLASTIC RADIATION THERAPY: ICD-10-CM

## 2024-06-03 LAB
RAD ONC MSQ ACTUAL FRACTIONS DELIVERED: 5
RAD ONC MSQ ACTUAL SESSION DELIVERED DOSE: 266 CGRAY
RAD ONC MSQ ACTUAL TOTAL DOSE: 1330 CGRAY
RAD ONC MSQ ELAPSED DAYS: 6
RAD ONC MSQ LAST DATE: NORMAL
RAD ONC MSQ PRESCRIBED FRACTIONAL DOSE: 266 CGRAY
RAD ONC MSQ PRESCRIBED NUMBER OF FRACTIONS: 16
RAD ONC MSQ PRESCRIBED TECHNIQUE: NORMAL
RAD ONC MSQ PRESCRIBED TOTAL DOSE: 4256 CGRAY
RAD ONC MSQ START DATE: NORMAL
RAD ONC MSQ TREATMENT COURSE NUMBER: 1
RAD ONC MSQ TREATMENT SITE: NORMAL

## 2024-06-03 PROCEDURE — 77412 RADIATION TX DELIVERY LVL 3: CPT | Performed by: STUDENT IN AN ORGANIZED HEALTH CARE EDUCATION/TRAINING PROGRAM

## 2024-06-03 PROCEDURE — 77387 GUIDANCE FOR RADJ TX DLVR: CPT | Performed by: STUDENT IN AN ORGANIZED HEALTH CARE EDUCATION/TRAINING PROGRAM

## 2024-06-04 ENCOUNTER — HOSPITAL ENCOUNTER (OUTPATIENT)
Dept: RADIATION ONCOLOGY | Facility: CLINIC | Age: 64
Setting detail: RADIATION/ONCOLOGY SERIES
Discharge: HOME | End: 2024-06-04
Payer: COMMERCIAL

## 2024-06-04 DIAGNOSIS — C50.412 MALIGNANT NEOPLASM OF UPPER-OUTER QUADRANT OF LEFT FEMALE BREAST (MULTI): ICD-10-CM

## 2024-06-04 DIAGNOSIS — C77.3 SECONDARY AND UNSPECIFIED MALIGNANT NEOPLASM OF AXILLA AND UPPER LIMB LYMPH NODES (MULTI): ICD-10-CM

## 2024-06-04 DIAGNOSIS — Z51.0 ENCOUNTER FOR ANTINEOPLASTIC RADIATION THERAPY: ICD-10-CM

## 2024-06-04 LAB
RAD ONC MSQ ACTUAL FRACTIONS DELIVERED: 6
RAD ONC MSQ ACTUAL SESSION DELIVERED DOSE: 266 CGRAY
RAD ONC MSQ ACTUAL TOTAL DOSE: 1596 CGRAY
RAD ONC MSQ ELAPSED DAYS: 7
RAD ONC MSQ LAST DATE: NORMAL
RAD ONC MSQ PRESCRIBED FRACTIONAL DOSE: 266 CGRAY
RAD ONC MSQ PRESCRIBED NUMBER OF FRACTIONS: 16
RAD ONC MSQ PRESCRIBED TECHNIQUE: NORMAL
RAD ONC MSQ PRESCRIBED TOTAL DOSE: 4256 CGRAY
RAD ONC MSQ START DATE: NORMAL
RAD ONC MSQ TREATMENT COURSE NUMBER: 1
RAD ONC MSQ TREATMENT SITE: NORMAL

## 2024-06-04 PROCEDURE — 77412 RADIATION TX DELIVERY LVL 3: CPT | Performed by: STUDENT IN AN ORGANIZED HEALTH CARE EDUCATION/TRAINING PROGRAM

## 2024-06-04 PROCEDURE — 77387 GUIDANCE FOR RADJ TX DLVR: CPT | Performed by: STUDENT IN AN ORGANIZED HEALTH CARE EDUCATION/TRAINING PROGRAM

## 2024-06-04 PROCEDURE — 77336 RADIATION PHYSICS CONSULT: CPT | Performed by: STUDENT IN AN ORGANIZED HEALTH CARE EDUCATION/TRAINING PROGRAM

## 2024-06-05 ENCOUNTER — HOSPITAL ENCOUNTER (OUTPATIENT)
Dept: RADIATION ONCOLOGY | Facility: CLINIC | Age: 64
Setting detail: RADIATION/ONCOLOGY SERIES
Discharge: HOME | End: 2024-06-05
Payer: COMMERCIAL

## 2024-06-05 DIAGNOSIS — C50.412 MALIGNANT NEOPLASM OF UPPER-OUTER QUADRANT OF LEFT FEMALE BREAST (MULTI): ICD-10-CM

## 2024-06-05 DIAGNOSIS — C77.3 SECONDARY AND UNSPECIFIED MALIGNANT NEOPLASM OF AXILLA AND UPPER LIMB LYMPH NODES (MULTI): ICD-10-CM

## 2024-06-05 DIAGNOSIS — Z51.0 ENCOUNTER FOR ANTINEOPLASTIC RADIATION THERAPY: ICD-10-CM

## 2024-06-05 LAB
RAD ONC MSQ ACTUAL FRACTIONS DELIVERED: 7
RAD ONC MSQ ACTUAL SESSION DELIVERED DOSE: 266 CGRAY
RAD ONC MSQ ACTUAL TOTAL DOSE: 1862 CGRAY
RAD ONC MSQ ELAPSED DAYS: 8
RAD ONC MSQ LAST DATE: NORMAL
RAD ONC MSQ PRESCRIBED FRACTIONAL DOSE: 266 CGRAY
RAD ONC MSQ PRESCRIBED NUMBER OF FRACTIONS: 16
RAD ONC MSQ PRESCRIBED TECHNIQUE: NORMAL
RAD ONC MSQ PRESCRIBED TOTAL DOSE: 4256 CGRAY
RAD ONC MSQ START DATE: NORMAL
RAD ONC MSQ TREATMENT COURSE NUMBER: 1
RAD ONC MSQ TREATMENT SITE: NORMAL

## 2024-06-05 PROCEDURE — 77387 GUIDANCE FOR RADJ TX DLVR: CPT | Performed by: STUDENT IN AN ORGANIZED HEALTH CARE EDUCATION/TRAINING PROGRAM

## 2024-06-05 PROCEDURE — 77412 RADIATION TX DELIVERY LVL 3: CPT | Performed by: STUDENT IN AN ORGANIZED HEALTH CARE EDUCATION/TRAINING PROGRAM

## 2024-06-06 ENCOUNTER — RADIATION ONCOLOGY OTV (OUTPATIENT)
Dept: RADIATION ONCOLOGY | Facility: CLINIC | Age: 64
End: 2024-06-06
Payer: COMMERCIAL

## 2024-06-06 ENCOUNTER — HOSPITAL ENCOUNTER (OUTPATIENT)
Dept: RADIATION ONCOLOGY | Facility: CLINIC | Age: 64
Setting detail: RADIATION/ONCOLOGY SERIES
Discharge: HOME | End: 2024-06-06
Payer: COMMERCIAL

## 2024-06-06 VITALS
BODY MASS INDEX: 36.34 KG/M2 | DIASTOLIC BLOOD PRESSURE: 61 MMHG | SYSTOLIC BLOOD PRESSURE: 109 MMHG | HEART RATE: 70 BPM | OXYGEN SATURATION: 95 % | RESPIRATION RATE: 18 BRPM | WEIGHT: 191.25 LBS | TEMPERATURE: 97 F

## 2024-06-06 DIAGNOSIS — C50.412 MALIGNANT NEOPLASM OF UPPER-OUTER QUADRANT OF LEFT FEMALE BREAST (MULTI): ICD-10-CM

## 2024-06-06 DIAGNOSIS — C77.3 SECONDARY AND UNSPECIFIED MALIGNANT NEOPLASM OF AXILLA AND UPPER LIMB LYMPH NODES (MULTI): ICD-10-CM

## 2024-06-06 DIAGNOSIS — Z51.0 ENCOUNTER FOR ANTINEOPLASTIC RADIATION THERAPY: ICD-10-CM

## 2024-06-06 LAB
RAD ONC MSQ ACTUAL FRACTIONS DELIVERED: 8
RAD ONC MSQ ACTUAL SESSION DELIVERED DOSE: 266 CGRAY
RAD ONC MSQ ACTUAL TOTAL DOSE: 2128 CGRAY
RAD ONC MSQ ELAPSED DAYS: 9
RAD ONC MSQ LAST DATE: NORMAL
RAD ONC MSQ PRESCRIBED FRACTIONAL DOSE: 266 CGRAY
RAD ONC MSQ PRESCRIBED NUMBER OF FRACTIONS: 16
RAD ONC MSQ PRESCRIBED TECHNIQUE: NORMAL
RAD ONC MSQ PRESCRIBED TOTAL DOSE: 4256 CGRAY
RAD ONC MSQ START DATE: NORMAL
RAD ONC MSQ TREATMENT COURSE NUMBER: 1
RAD ONC MSQ TREATMENT SITE: NORMAL

## 2024-06-06 PROCEDURE — 77387 GUIDANCE FOR RADJ TX DLVR: CPT | Performed by: STUDENT IN AN ORGANIZED HEALTH CARE EDUCATION/TRAINING PROGRAM

## 2024-06-06 PROCEDURE — 77412 RADIATION TX DELIVERY LVL 3: CPT | Performed by: STUDENT IN AN ORGANIZED HEALTH CARE EDUCATION/TRAINING PROGRAM

## 2024-06-06 ASSESSMENT — PAIN SCALES - GENERAL: PAINLEVEL: 0-NO PAIN

## 2024-06-06 NOTE — PROGRESS NOTES
Radiation Oncology On Treatment Visit    Patient Name:  Nidia Smith  MRN:  [id number]  :  1960    Referring Provider: No ref. provider found  Primary Care Provider: Jason Irby MD  Care Team: Patient Care Team:  Jason Irby MD as PCP - General (Family Medicine)  Jason Irby MD as PCP - [facility] PCP  Jason Irby MD as Primary Care Provider  Jason Franks MD as Consulting Physician (Hematology and Oncology)    Date of Service: 2024     Diagnosis:   Specialty Problems          Radiation Oncology Problems    Infiltrating ductal carcinoma of upper-outer quadrant of left breast in female (Multi)        Malignant neoplasm of left breast in female, estrogen receptor positive (Multi)         Treatment Summary:  3D CRT: Left Upper outer quadrant of breast    Treatment Period Technique Fraction Dose Fractions Total Dose   Course 1 2024-2024  (days elapsed: 9)         L breast 2024-2024 3D 266 / 266 cGy  16 2128 / 4,256 cGy     SUBJECTIVE: Well. Mild erythema, overall unchanged. Using skin cream regularly. No pain/pruritus. No desquamation. Denies any fatigue.      OBJECTIVE:   Vital Signs:  /61   Pulse 70   Temp 36.1 °C (97 °F)   Resp 18   Wt 86.7 kg (191 lb 4 oz)   SpO2 95%   BMI 36.34 kg/m²    Pain Scale: The patient's current pain level was assessed.  They report currently having a pain of 0 out of 10.    Other Pertinent Findings:     Toxicity Assessment          2024    15:04 2024    15:19 2024    15:35   Toxicity Assessment   Treatment Site Breast Breast Breast   Anorexia Grade 0 Grade 0    Anxiety Grade 0 Grade 0    Dehydration Grade 0 Grade 0    Depression Grade 0 Grade 0    Dermatitis Radiation Grade 0       Patient instructed to use Udderly Smooth BID Grade 0       using Udderly smooth BID Grade 1       slight redness to left breast   Diarrhea Grade 0 Grade 0    Fatigue Grade 0 Grade 0    Fibrosis Deep Connective Tissue Grade 0 Grade 0     Fracture Grade 0 Grade 0    Nausea Grade 0 Grade 0    Pain Grade 0 Grade 0    Treatment Related Secondary Malignancy Grade 0 Grade 0    Tumor Pain Grade 0 Grade 0    Vomiting Grade 0 Grade 0    Abdominal Pain Grade 0 Grade 0    Dysphagia Grade 0 Grade 0    Esophagitis Grade 0 Grade 0    Gastric Hemorrhage Grade 0 Grade 0    Mucositis Oral Grade 0 Grade 0    Dry Mouth Grade 0 Grade 0    Breast Infection Grade 0 Grade 0    Seroma Grade 0 Grade 0    Joint Range of Motion Decreased Grade 0 Grade 0    Joint Range of Motion Decreased Lumbar Spine Grade 0 Grade 0    Brachial Plexopathy Grade 0 Grade 0    Breast Atrophy Grade 0 Grade 0    Breast Pain Grade 0 Grade 0    Nipple Deformity Grade 0 Grade 0    Pneumonitis Grade 0 Grade 0    Edema Limbs Grade 0 Grade 0    Lymphedema Grade 0 Grade 0    Thromboembolic Event Grade 0 Grade 0    Hot Flashes Grade 1       very rare Grade 0         Assessment / Plan:  The patient is tolerating radiation therapy as anticipated.  Continue per current treatment plan.

## 2024-06-07 ENCOUNTER — HOSPITAL ENCOUNTER (OUTPATIENT)
Dept: RADIATION ONCOLOGY | Facility: CLINIC | Age: 64
Setting detail: RADIATION/ONCOLOGY SERIES
Discharge: HOME | End: 2024-06-07
Payer: COMMERCIAL

## 2024-06-07 DIAGNOSIS — C50.412 MALIGNANT NEOPLASM OF UPPER-OUTER QUADRANT OF LEFT FEMALE BREAST (MULTI): ICD-10-CM

## 2024-06-07 DIAGNOSIS — C77.3 SECONDARY AND UNSPECIFIED MALIGNANT NEOPLASM OF AXILLA AND UPPER LIMB LYMPH NODES (MULTI): ICD-10-CM

## 2024-06-07 DIAGNOSIS — Z51.0 ENCOUNTER FOR ANTINEOPLASTIC RADIATION THERAPY: ICD-10-CM

## 2024-06-07 LAB
RAD ONC MSQ ACTUAL FRACTIONS DELIVERED: 9
RAD ONC MSQ ACTUAL SESSION DELIVERED DOSE: 266 CGRAY
RAD ONC MSQ ACTUAL TOTAL DOSE: 2394 CGRAY
RAD ONC MSQ ELAPSED DAYS: 10
RAD ONC MSQ LAST DATE: NORMAL
RAD ONC MSQ PRESCRIBED FRACTIONAL DOSE: 266 CGRAY
RAD ONC MSQ PRESCRIBED NUMBER OF FRACTIONS: 16
RAD ONC MSQ PRESCRIBED TECHNIQUE: NORMAL
RAD ONC MSQ PRESCRIBED TOTAL DOSE: 4256 CGRAY
RAD ONC MSQ START DATE: NORMAL
RAD ONC MSQ TREATMENT COURSE NUMBER: 1
RAD ONC MSQ TREATMENT SITE: NORMAL

## 2024-06-07 PROCEDURE — 77387 GUIDANCE FOR RADJ TX DLVR: CPT | Performed by: STUDENT IN AN ORGANIZED HEALTH CARE EDUCATION/TRAINING PROGRAM

## 2024-06-07 PROCEDURE — 77412 RADIATION TX DELIVERY LVL 3: CPT | Performed by: STUDENT IN AN ORGANIZED HEALTH CARE EDUCATION/TRAINING PROGRAM

## 2024-06-10 ENCOUNTER — HOSPITAL ENCOUNTER (OUTPATIENT)
Dept: RADIATION ONCOLOGY | Facility: CLINIC | Age: 64
Setting detail: RADIATION/ONCOLOGY SERIES
Discharge: HOME | End: 2024-06-10
Payer: COMMERCIAL

## 2024-06-10 DIAGNOSIS — C77.3 SECONDARY AND UNSPECIFIED MALIGNANT NEOPLASM OF AXILLA AND UPPER LIMB LYMPH NODES (MULTI): ICD-10-CM

## 2024-06-10 DIAGNOSIS — Z51.0 ENCOUNTER FOR ANTINEOPLASTIC RADIATION THERAPY: ICD-10-CM

## 2024-06-10 DIAGNOSIS — C50.412 MALIGNANT NEOPLASM OF UPPER-OUTER QUADRANT OF LEFT FEMALE BREAST (MULTI): ICD-10-CM

## 2024-06-10 LAB
RAD ONC MSQ ACTUAL FRACTIONS DELIVERED: 10
RAD ONC MSQ ACTUAL SESSION DELIVERED DOSE: 266 CGRAY
RAD ONC MSQ ACTUAL TOTAL DOSE: 2660 CGRAY
RAD ONC MSQ ELAPSED DAYS: 13
RAD ONC MSQ LAST DATE: NORMAL
RAD ONC MSQ PRESCRIBED FRACTIONAL DOSE: 266 CGRAY
RAD ONC MSQ PRESCRIBED NUMBER OF FRACTIONS: 16
RAD ONC MSQ PRESCRIBED TECHNIQUE: NORMAL
RAD ONC MSQ PRESCRIBED TOTAL DOSE: 4256 CGRAY
RAD ONC MSQ START DATE: NORMAL
RAD ONC MSQ TREATMENT COURSE NUMBER: 1
RAD ONC MSQ TREATMENT SITE: NORMAL

## 2024-06-10 PROCEDURE — 77412 RADIATION TX DELIVERY LVL 3: CPT | Performed by: STUDENT IN AN ORGANIZED HEALTH CARE EDUCATION/TRAINING PROGRAM

## 2024-06-10 PROCEDURE — 77387 GUIDANCE FOR RADJ TX DLVR: CPT | Performed by: STUDENT IN AN ORGANIZED HEALTH CARE EDUCATION/TRAINING PROGRAM

## 2024-06-11 ENCOUNTER — HOSPITAL ENCOUNTER (OUTPATIENT)
Dept: RADIATION ONCOLOGY | Facility: CLINIC | Age: 64
Setting detail: RADIATION/ONCOLOGY SERIES
Discharge: HOME | End: 2024-06-11
Payer: COMMERCIAL

## 2024-06-11 DIAGNOSIS — C77.3 SECONDARY AND UNSPECIFIED MALIGNANT NEOPLASM OF AXILLA AND UPPER LIMB LYMPH NODES (MULTI): ICD-10-CM

## 2024-06-11 DIAGNOSIS — Z51.0 ENCOUNTER FOR ANTINEOPLASTIC RADIATION THERAPY: ICD-10-CM

## 2024-06-11 DIAGNOSIS — C50.412 MALIGNANT NEOPLASM OF UPPER-OUTER QUADRANT OF LEFT FEMALE BREAST (MULTI): ICD-10-CM

## 2024-06-11 LAB
RAD ONC MSQ ACTUAL FRACTIONS DELIVERED: 11
RAD ONC MSQ ACTUAL SESSION DELIVERED DOSE: 266 CGRAY
RAD ONC MSQ ACTUAL TOTAL DOSE: 2926 CGRAY
RAD ONC MSQ ELAPSED DAYS: 14
RAD ONC MSQ LAST DATE: NORMAL
RAD ONC MSQ PRESCRIBED FRACTIONAL DOSE: 266 CGRAY
RAD ONC MSQ PRESCRIBED NUMBER OF FRACTIONS: 16
RAD ONC MSQ PRESCRIBED TECHNIQUE: NORMAL
RAD ONC MSQ PRESCRIBED TOTAL DOSE: 4256 CGRAY
RAD ONC MSQ START DATE: NORMAL
RAD ONC MSQ TREATMENT COURSE NUMBER: 1
RAD ONC MSQ TREATMENT SITE: NORMAL

## 2024-06-11 PROCEDURE — 77336 RADIATION PHYSICS CONSULT: CPT | Performed by: STUDENT IN AN ORGANIZED HEALTH CARE EDUCATION/TRAINING PROGRAM

## 2024-06-11 PROCEDURE — 77387 GUIDANCE FOR RADJ TX DLVR: CPT | Performed by: STUDENT IN AN ORGANIZED HEALTH CARE EDUCATION/TRAINING PROGRAM

## 2024-06-11 PROCEDURE — 77412 RADIATION TX DELIVERY LVL 3: CPT | Performed by: STUDENT IN AN ORGANIZED HEALTH CARE EDUCATION/TRAINING PROGRAM

## 2024-06-12 ENCOUNTER — HOSPITAL ENCOUNTER (OUTPATIENT)
Dept: RADIATION ONCOLOGY | Facility: CLINIC | Age: 64
Setting detail: RADIATION/ONCOLOGY SERIES
Discharge: HOME | End: 2024-06-12
Payer: COMMERCIAL

## 2024-06-12 DIAGNOSIS — C77.3 SECONDARY AND UNSPECIFIED MALIGNANT NEOPLASM OF AXILLA AND UPPER LIMB LYMPH NODES (MULTI): ICD-10-CM

## 2024-06-12 DIAGNOSIS — Z51.0 ENCOUNTER FOR ANTINEOPLASTIC RADIATION THERAPY: ICD-10-CM

## 2024-06-12 DIAGNOSIS — C50.412 MALIGNANT NEOPLASM OF UPPER-OUTER QUADRANT OF LEFT FEMALE BREAST (MULTI): ICD-10-CM

## 2024-06-12 LAB
RAD ONC MSQ ACTUAL FRACTIONS DELIVERED: 12
RAD ONC MSQ ACTUAL SESSION DELIVERED DOSE: 266 CGRAY
RAD ONC MSQ ACTUAL TOTAL DOSE: 3192 CGRAY
RAD ONC MSQ ELAPSED DAYS: 15
RAD ONC MSQ LAST DATE: NORMAL
RAD ONC MSQ PRESCRIBED FRACTIONAL DOSE: 266 CGRAY
RAD ONC MSQ PRESCRIBED NUMBER OF FRACTIONS: 16
RAD ONC MSQ PRESCRIBED TECHNIQUE: NORMAL
RAD ONC MSQ PRESCRIBED TOTAL DOSE: 4256 CGRAY
RAD ONC MSQ START DATE: NORMAL
RAD ONC MSQ TREATMENT COURSE NUMBER: 1
RAD ONC MSQ TREATMENT SITE: NORMAL

## 2024-06-12 PROCEDURE — 77387 GUIDANCE FOR RADJ TX DLVR: CPT | Performed by: STUDENT IN AN ORGANIZED HEALTH CARE EDUCATION/TRAINING PROGRAM

## 2024-06-12 PROCEDURE — 77412 RADIATION TX DELIVERY LVL 3: CPT | Performed by: STUDENT IN AN ORGANIZED HEALTH CARE EDUCATION/TRAINING PROGRAM

## 2024-06-13 ENCOUNTER — HOSPITAL ENCOUNTER (OUTPATIENT)
Dept: RADIATION ONCOLOGY | Facility: CLINIC | Age: 64
Setting detail: RADIATION/ONCOLOGY SERIES
Discharge: HOME | End: 2024-06-13
Payer: COMMERCIAL

## 2024-06-13 DIAGNOSIS — C50.412 MALIGNANT NEOPLASM OF UPPER-OUTER QUADRANT OF LEFT FEMALE BREAST (MULTI): ICD-10-CM

## 2024-06-13 DIAGNOSIS — C77.3 SECONDARY AND UNSPECIFIED MALIGNANT NEOPLASM OF AXILLA AND UPPER LIMB LYMPH NODES (MULTI): ICD-10-CM

## 2024-06-13 DIAGNOSIS — Z51.0 ENCOUNTER FOR ANTINEOPLASTIC RADIATION THERAPY: ICD-10-CM

## 2024-06-13 LAB
RAD ONC MSQ ACTUAL FRACTIONS DELIVERED: 13
RAD ONC MSQ ACTUAL SESSION DELIVERED DOSE: 266 CGRAY
RAD ONC MSQ ACTUAL TOTAL DOSE: 3458 CGRAY
RAD ONC MSQ ELAPSED DAYS: 16
RAD ONC MSQ LAST DATE: NORMAL
RAD ONC MSQ PRESCRIBED FRACTIONAL DOSE: 266 CGRAY
RAD ONC MSQ PRESCRIBED NUMBER OF FRACTIONS: 16
RAD ONC MSQ PRESCRIBED TECHNIQUE: NORMAL
RAD ONC MSQ PRESCRIBED TOTAL DOSE: 4256 CGRAY
RAD ONC MSQ START DATE: NORMAL
RAD ONC MSQ TREATMENT COURSE NUMBER: 1
RAD ONC MSQ TREATMENT SITE: NORMAL

## 2024-06-13 PROCEDURE — 77412 RADIATION TX DELIVERY LVL 3: CPT | Performed by: STUDENT IN AN ORGANIZED HEALTH CARE EDUCATION/TRAINING PROGRAM

## 2024-06-13 PROCEDURE — 77387 GUIDANCE FOR RADJ TX DLVR: CPT | Performed by: RADIOLOGY

## 2024-06-14 ENCOUNTER — HOSPITAL ENCOUNTER (OUTPATIENT)
Dept: RADIATION ONCOLOGY | Facility: CLINIC | Age: 64
Setting detail: RADIATION/ONCOLOGY SERIES
Discharge: HOME | End: 2024-06-14
Payer: COMMERCIAL

## 2024-06-14 DIAGNOSIS — C77.3 SECONDARY AND UNSPECIFIED MALIGNANT NEOPLASM OF AXILLA AND UPPER LIMB LYMPH NODES (MULTI): ICD-10-CM

## 2024-06-14 DIAGNOSIS — Z51.0 ENCOUNTER FOR ANTINEOPLASTIC RADIATION THERAPY: ICD-10-CM

## 2024-06-14 DIAGNOSIS — C50.412 MALIGNANT NEOPLASM OF UPPER-OUTER QUADRANT OF LEFT FEMALE BREAST (MULTI): ICD-10-CM

## 2024-06-14 LAB
RAD ONC MSQ ACTUAL FRACTIONS DELIVERED: 14
RAD ONC MSQ ACTUAL SESSION DELIVERED DOSE: 266 CGRAY
RAD ONC MSQ ACTUAL TOTAL DOSE: 3724 CGRAY
RAD ONC MSQ ELAPSED DAYS: 17
RAD ONC MSQ LAST DATE: NORMAL
RAD ONC MSQ PRESCRIBED FRACTIONAL DOSE: 266 CGRAY
RAD ONC MSQ PRESCRIBED NUMBER OF FRACTIONS: 16
RAD ONC MSQ PRESCRIBED TECHNIQUE: NORMAL
RAD ONC MSQ PRESCRIBED TOTAL DOSE: 4256 CGRAY
RAD ONC MSQ START DATE: NORMAL
RAD ONC MSQ TREATMENT COURSE NUMBER: 1
RAD ONC MSQ TREATMENT SITE: NORMAL

## 2024-06-14 PROCEDURE — 77387 GUIDANCE FOR RADJ TX DLVR: CPT | Performed by: STUDENT IN AN ORGANIZED HEALTH CARE EDUCATION/TRAINING PROGRAM

## 2024-06-14 PROCEDURE — 77412 RADIATION TX DELIVERY LVL 3: CPT | Performed by: STUDENT IN AN ORGANIZED HEALTH CARE EDUCATION/TRAINING PROGRAM

## 2024-06-16 DIAGNOSIS — E78.00 HYPERCHOLESTEROLEMIA: ICD-10-CM

## 2024-06-17 ENCOUNTER — RADIATION ONCOLOGY OTV (OUTPATIENT)
Dept: RADIATION ONCOLOGY | Facility: CLINIC | Age: 64
End: 2024-06-17
Payer: COMMERCIAL

## 2024-06-17 ENCOUNTER — HOSPITAL ENCOUNTER (OUTPATIENT)
Dept: RADIATION ONCOLOGY | Facility: CLINIC | Age: 64
Setting detail: RADIATION/ONCOLOGY SERIES
Discharge: HOME | End: 2024-06-17
Payer: COMMERCIAL

## 2024-06-17 VITALS
BODY MASS INDEX: 36.03 KG/M2 | RESPIRATION RATE: 16 BRPM | HEART RATE: 75 BPM | OXYGEN SATURATION: 94 % | SYSTOLIC BLOOD PRESSURE: 130 MMHG | WEIGHT: 189.6 LBS | DIASTOLIC BLOOD PRESSURE: 77 MMHG | TEMPERATURE: 96.4 F

## 2024-06-17 DIAGNOSIS — C77.3 SECONDARY AND UNSPECIFIED MALIGNANT NEOPLASM OF AXILLA AND UPPER LIMB LYMPH NODES (MULTI): ICD-10-CM

## 2024-06-17 DIAGNOSIS — Z51.0 ENCOUNTER FOR ANTINEOPLASTIC RADIATION THERAPY: ICD-10-CM

## 2024-06-17 DIAGNOSIS — C50.412 MALIGNANT NEOPLASM OF UPPER-OUTER QUADRANT OF LEFT FEMALE BREAST (MULTI): ICD-10-CM

## 2024-06-17 LAB
RAD ONC MSQ ACTUAL FRACTIONS DELIVERED: 15
RAD ONC MSQ ACTUAL SESSION DELIVERED DOSE: 266 CGRAY
RAD ONC MSQ ACTUAL TOTAL DOSE: 3990 CGRAY
RAD ONC MSQ ELAPSED DAYS: 20
RAD ONC MSQ LAST DATE: NORMAL
RAD ONC MSQ PRESCRIBED FRACTIONAL DOSE: 266 CGRAY
RAD ONC MSQ PRESCRIBED NUMBER OF FRACTIONS: 16
RAD ONC MSQ PRESCRIBED TECHNIQUE: NORMAL
RAD ONC MSQ PRESCRIBED TOTAL DOSE: 4256 CGRAY
RAD ONC MSQ START DATE: NORMAL
RAD ONC MSQ TREATMENT COURSE NUMBER: 1
RAD ONC MSQ TREATMENT SITE: NORMAL

## 2024-06-17 PROCEDURE — 77387 GUIDANCE FOR RADJ TX DLVR: CPT | Performed by: STUDENT IN AN ORGANIZED HEALTH CARE EDUCATION/TRAINING PROGRAM

## 2024-06-17 PROCEDURE — 77412 RADIATION TX DELIVERY LVL 3: CPT | Performed by: STUDENT IN AN ORGANIZED HEALTH CARE EDUCATION/TRAINING PROGRAM

## 2024-06-17 RX ORDER — ATORVASTATIN CALCIUM 40 MG/1
40 TABLET, FILM COATED ORAL DAILY
Qty: 30 TABLET | Refills: 8 | Status: SHIPPED | OUTPATIENT
Start: 2024-06-17

## 2024-06-17 ASSESSMENT — PAIN SCALES - GENERAL: PAINLEVEL: 0-NO PAIN

## 2024-06-17 ASSESSMENT — ENCOUNTER SYMPTOMS
DEPRESSION: 0
LOSS OF SENSATION IN FEET: 0
OCCASIONAL FEELINGS OF UNSTEADINESS: 0

## 2024-06-17 ASSESSMENT — PATIENT HEALTH QUESTIONNAIRE - PHQ9
SUM OF ALL RESPONSES TO PHQ9 QUESTIONS 1 AND 2: 0
1. LITTLE INTEREST OR PLEASURE IN DOING THINGS: NOT AT ALL
2. FEELING DOWN, DEPRESSED OR HOPELESS: NOT AT ALL

## 2024-06-18 ENCOUNTER — HOSPITAL ENCOUNTER (OUTPATIENT)
Dept: RADIATION ONCOLOGY | Facility: CLINIC | Age: 64
Setting detail: RADIATION/ONCOLOGY SERIES
Discharge: HOME | End: 2024-06-18
Payer: COMMERCIAL

## 2024-06-18 DIAGNOSIS — Z51.0 ENCOUNTER FOR ANTINEOPLASTIC RADIATION THERAPY: ICD-10-CM

## 2024-06-18 DIAGNOSIS — C77.3 SECONDARY AND UNSPECIFIED MALIGNANT NEOPLASM OF AXILLA AND UPPER LIMB LYMPH NODES (MULTI): ICD-10-CM

## 2024-06-18 DIAGNOSIS — C50.412 MALIGNANT NEOPLASM OF UPPER-OUTER QUADRANT OF LEFT FEMALE BREAST (MULTI): ICD-10-CM

## 2024-06-18 LAB
RAD ONC MSQ ACTUAL FRACTIONS DELIVERED: 16
RAD ONC MSQ ACTUAL SESSION DELIVERED DOSE: 266 CGRAY
RAD ONC MSQ ACTUAL TOTAL DOSE: 4256 CGRAY
RAD ONC MSQ ELAPSED DAYS: 21
RAD ONC MSQ LAST DATE: NORMAL
RAD ONC MSQ PRESCRIBED FRACTIONAL DOSE: 266 CGRAY
RAD ONC MSQ PRESCRIBED NUMBER OF FRACTIONS: 16
RAD ONC MSQ PRESCRIBED TECHNIQUE: NORMAL
RAD ONC MSQ PRESCRIBED TOTAL DOSE: 4256 CGRAY
RAD ONC MSQ START DATE: NORMAL
RAD ONC MSQ TREATMENT COURSE NUMBER: 1
RAD ONC MSQ TREATMENT SITE: NORMAL

## 2024-06-18 PROCEDURE — 77336 RADIATION PHYSICS CONSULT: CPT | Performed by: STUDENT IN AN ORGANIZED HEALTH CARE EDUCATION/TRAINING PROGRAM

## 2024-06-18 PROCEDURE — 77412 RADIATION TX DELIVERY LVL 3: CPT | Performed by: STUDENT IN AN ORGANIZED HEALTH CARE EDUCATION/TRAINING PROGRAM

## 2024-06-18 PROCEDURE — 77387 GUIDANCE FOR RADJ TX DLVR: CPT | Performed by: STUDENT IN AN ORGANIZED HEALTH CARE EDUCATION/TRAINING PROGRAM

## 2024-06-19 ENCOUNTER — HOSPITAL ENCOUNTER (OUTPATIENT)
Dept: RADIATION ONCOLOGY | Facility: CLINIC | Age: 64
Setting detail: RADIATION/ONCOLOGY SERIES
Discharge: HOME | End: 2024-06-19
Payer: COMMERCIAL

## 2024-06-19 DIAGNOSIS — C50.412 MALIGNANT NEOPLASM OF UPPER-OUTER QUADRANT OF LEFT FEMALE BREAST (MULTI): ICD-10-CM

## 2024-06-19 DIAGNOSIS — Z51.0 ENCOUNTER FOR ANTINEOPLASTIC RADIATION THERAPY: ICD-10-CM

## 2024-06-19 DIAGNOSIS — C77.3 SECONDARY AND UNSPECIFIED MALIGNANT NEOPLASM OF AXILLA AND UPPER LIMB LYMPH NODES (MULTI): ICD-10-CM

## 2024-06-19 LAB
RAD ONC MSQ ACTUAL FRACTIONS DELIVERED: 1
RAD ONC MSQ ACTUAL SESSION DELIVERED DOSE: 250 CGRAY
RAD ONC MSQ ACTUAL TOTAL DOSE: 250 CGRAY
RAD ONC MSQ ELAPSED DAYS: 0
RAD ONC MSQ LAST DATE: NORMAL
RAD ONC MSQ PRESCRIBED FRACTIONAL DOSE: 250 CGRAY
RAD ONC MSQ PRESCRIBED NUMBER OF FRACTIONS: 4
RAD ONC MSQ PRESCRIBED TECHNIQUE: NORMAL
RAD ONC MSQ PRESCRIBED TOTAL DOSE: 1000 CGRAY
RAD ONC MSQ START DATE: NORMAL
RAD ONC MSQ TREATMENT COURSE NUMBER: 1
RAD ONC MSQ TREATMENT SITE: NORMAL

## 2024-06-19 PROCEDURE — 77412 RADIATION TX DELIVERY LVL 3: CPT | Performed by: STUDENT IN AN ORGANIZED HEALTH CARE EDUCATION/TRAINING PROGRAM

## 2024-06-19 PROCEDURE — 77280 THER RAD SIMULAJ FIELD SMPL: CPT | Performed by: STUDENT IN AN ORGANIZED HEALTH CARE EDUCATION/TRAINING PROGRAM

## 2024-06-20 ENCOUNTER — HOSPITAL ENCOUNTER (OUTPATIENT)
Dept: RADIATION ONCOLOGY | Facility: CLINIC | Age: 64
Setting detail: RADIATION/ONCOLOGY SERIES
Discharge: HOME | End: 2024-06-20
Payer: COMMERCIAL

## 2024-06-20 ENCOUNTER — RADIATION ONCOLOGY OTV (OUTPATIENT)
Dept: RADIATION ONCOLOGY | Facility: CLINIC | Age: 64
End: 2024-06-20
Payer: COMMERCIAL

## 2024-06-20 VITALS
HEART RATE: 81 BPM | DIASTOLIC BLOOD PRESSURE: 88 MMHG | BODY MASS INDEX: 35.94 KG/M2 | WEIGHT: 189.15 LBS | SYSTOLIC BLOOD PRESSURE: 127 MMHG | OXYGEN SATURATION: 98 % | RESPIRATION RATE: 18 BRPM | TEMPERATURE: 95.9 F

## 2024-06-20 DIAGNOSIS — C77.3 SECONDARY AND UNSPECIFIED MALIGNANT NEOPLASM OF AXILLA AND UPPER LIMB LYMPH NODES (MULTI): ICD-10-CM

## 2024-06-20 DIAGNOSIS — Z51.0 ENCOUNTER FOR ANTINEOPLASTIC RADIATION THERAPY: ICD-10-CM

## 2024-06-20 DIAGNOSIS — Z17.0 MALIGNANT NEOPLASM OF LEFT BREAST IN FEMALE, ESTROGEN RECEPTOR POSITIVE, UNSPECIFIED SITE OF BREAST (MULTI): ICD-10-CM

## 2024-06-20 DIAGNOSIS — C50.912 MALIGNANT NEOPLASM OF LEFT BREAST IN FEMALE, ESTROGEN RECEPTOR POSITIVE, UNSPECIFIED SITE OF BREAST (MULTI): ICD-10-CM

## 2024-06-20 DIAGNOSIS — C50.412 MALIGNANT NEOPLASM OF UPPER-OUTER QUADRANT OF LEFT FEMALE BREAST (MULTI): ICD-10-CM

## 2024-06-20 LAB
RAD ONC MSQ ACTUAL FRACTIONS DELIVERED: 2
RAD ONC MSQ ACTUAL SESSION DELIVERED DOSE: 250 CGRAY
RAD ONC MSQ ACTUAL TOTAL DOSE: 500 CGRAY
RAD ONC MSQ ELAPSED DAYS: 1
RAD ONC MSQ LAST DATE: NORMAL
RAD ONC MSQ PRESCRIBED FRACTIONAL DOSE: 250 CGRAY
RAD ONC MSQ PRESCRIBED NUMBER OF FRACTIONS: 4
RAD ONC MSQ PRESCRIBED TECHNIQUE: NORMAL
RAD ONC MSQ PRESCRIBED TOTAL DOSE: 1000 CGRAY
RAD ONC MSQ START DATE: NORMAL
RAD ONC MSQ TREATMENT COURSE NUMBER: 1
RAD ONC MSQ TREATMENT SITE: NORMAL

## 2024-06-20 PROCEDURE — 77387 GUIDANCE FOR RADJ TX DLVR: CPT | Performed by: STUDENT IN AN ORGANIZED HEALTH CARE EDUCATION/TRAINING PROGRAM

## 2024-06-20 PROCEDURE — 77412 RADIATION TX DELIVERY LVL 3: CPT | Performed by: STUDENT IN AN ORGANIZED HEALTH CARE EDUCATION/TRAINING PROGRAM

## 2024-06-20 ASSESSMENT — ENCOUNTER SYMPTOMS
LOSS OF SENSATION IN FEET: 0
OCCASIONAL FEELINGS OF UNSTEADINESS: 0
DEPRESSION: 0

## 2024-06-20 ASSESSMENT — PAIN SCALES - GENERAL: PAINLEVEL: 0-NO PAIN

## 2024-06-20 NOTE — PROGRESS NOTES
Radiation Oncology On Treatment Visit    Patient Name:  Nidia Smith  MRN:  [id number]  :  1960    Referring Provider: No ref. provider found  Primary Care Provider: Jason Irby MD  Care Team: Patient Care Team:  Jason Irby MD as PCP - General (Family Medicine)  Jason Irby MD as PCP - [facility] PCP  Jason Irby MD as Primary Care Provider  Jason Franks MD as Consulting Physician (Hematology and Oncology)    Date of Service: 2024     Diagnosis:   Specialty Problems          Radiation Oncology Problems    Infiltrating ductal carcinoma of upper-outer quadrant of left breast in female (Multi)        Malignant neoplasm of left breast in female, estrogen receptor positive (Multi)         Treatment Summary:  3D CRT: Left Upper outer quadrant of breast    Treatment Period Technique Fraction Dose Fractions Total Dose   Course 1 2024-2024  (days elapsed: 23)         L breast 2024-2024 3D 266 / 266 cGy  4256 / 4,256 cGy         L breast boost 2024-2024 3-Field Boost 250 / 250 cGy 2 / 4 500 / 1,000 cGy     SUBJECTIVE: Stable to slightly increased erythema around the breast and below the axilla, with some moist desquamation now along the inframammary fold. Denies any pain or discomfort in the area, and advised to start Aquaphor. Will send in prescription for Silvadene if this area worsens or becomes painful.     OBJECTIVE:   Vital Signs:  /88   Pulse 81   Temp 35.5 °C (95.9 °F)   Resp 18   Wt 85.8 kg (189 lb 2.5 oz)   SpO2 98%   BMI 35.94 kg/m²    Pain Scale: The patient's current pain level was assessed.  They report currently having a pain of 0 out of 10.    Other Pertinent Findings:     Toxicity Assessment          2024    15:04 2024    15:19 2024    15:35 2024    15:50 2024    15:31   Toxicity Assessment   Treatment Site Breast Breast Breast Breast Breast   Anorexia Grade 0 Grade 0  Grade 0 Grade 0   Anxiety Grade 0  Grade 0  Grade 0 Grade 0   Dehydration Grade 0 Grade 0  Grade 0 Grade 0   Depression Grade 0 Grade 0  Grade 0 Grade 0   Dermatitis Radiation Grade 0       Patient instructed to use Udderly Smooth BID Grade 0       using Udderly smooth BID Grade 1       slight redness to left breast Grade 2       redness to left breast and underarm, using udderly smooth Grade 2       open area under left breast   Diarrhea Grade 0 Grade 0  Grade 0 Grade 0   Fatigue Grade 0 Grade 0  Grade 0 Grade 0   Fibrosis Deep Connective Tissue Grade 0 Grade 0  Grade 0 Grade 0   Fracture Grade 0 Grade 0  Grade 0 Grade 0   Nausea Grade 0 Grade 0  Grade 0 Grade 0   Pain Grade 0 Grade 0  Grade 0 Grade 0   Treatment Related Secondary Malignancy Grade 0 Grade 0  Grade 0 Grade 0   Tumor Pain Grade 0 Grade 0  Grade 0 Grade 0   Vomiting Grade 0 Grade 0  Grade 0 Grade 0   Abdominal Pain Grade 0 Grade 0   Grade 0   Dysphagia Grade 0 Grade 0   Grade 0   Esophagitis Grade 0 Grade 0   Grade 0   Gastric Hemorrhage Grade 0 Grade 0   Grade 0   Mucositis Oral Grade 0 Grade 0   Grade 0   Dry Mouth Grade 0 Grade 0  Grade 0 Grade 0   Breast Infection Grade 0 Grade 0  Grade 0 Grade 0   Seroma Grade 0 Grade 0  Grade 0 Grade 0   Joint Range of Motion Decreased Grade 0 Grade 0  Grade 0 Grade 0   Joint Range of Motion Decreased Lumbar Spine Grade 0 Grade 0  Grade 0 Grade 0   Brachial Plexopathy Grade 0 Grade 0  Grade 0 Grade 0   Breast Atrophy Grade 0 Grade 0  Grade 0 Grade 0   Breast Pain Grade 0 Grade 0  Grade 0 Grade 0   Nipple Deformity Grade 0 Grade 0  Grade 0 Grade 0   Pneumonitis Grade 0 Grade 0  Grade 0 Grade 0   Edema Limbs Grade 0 Grade 0  Grade 0 Grade 0   Lymphedema Grade 0 Grade 0  Grade 0 Grade 0   Thromboembolic Event Grade 0 Grade 0  Grade 0 Grade 0   Hot Flashes Grade 1       very rare Grade 0  Grade 0 Grade 0        Assessment / Plan:  The patient is tolerating radiation therapy as anticipated.  Continue per current treatment plan.

## 2024-06-21 ENCOUNTER — HOSPITAL ENCOUNTER (OUTPATIENT)
Dept: RADIATION ONCOLOGY | Facility: CLINIC | Age: 64
Setting detail: RADIATION/ONCOLOGY SERIES
Discharge: HOME | End: 2024-06-21
Payer: COMMERCIAL

## 2024-06-21 DIAGNOSIS — C77.3 SECONDARY AND UNSPECIFIED MALIGNANT NEOPLASM OF AXILLA AND UPPER LIMB LYMPH NODES (MULTI): ICD-10-CM

## 2024-06-21 DIAGNOSIS — Z51.0 ENCOUNTER FOR ANTINEOPLASTIC RADIATION THERAPY: ICD-10-CM

## 2024-06-21 DIAGNOSIS — C50.412 MALIGNANT NEOPLASM OF UPPER-OUTER QUADRANT OF LEFT FEMALE BREAST (MULTI): ICD-10-CM

## 2024-06-21 LAB
RAD ONC MSQ ACTUAL FRACTIONS DELIVERED: 3
RAD ONC MSQ ACTUAL SESSION DELIVERED DOSE: 250 CGRAY
RAD ONC MSQ ACTUAL TOTAL DOSE: 750 CGRAY
RAD ONC MSQ ELAPSED DAYS: 2
RAD ONC MSQ LAST DATE: NORMAL
RAD ONC MSQ PRESCRIBED FRACTIONAL DOSE: 250 CGRAY
RAD ONC MSQ PRESCRIBED NUMBER OF FRACTIONS: 4
RAD ONC MSQ PRESCRIBED TECHNIQUE: NORMAL
RAD ONC MSQ PRESCRIBED TOTAL DOSE: 1000 CGRAY
RAD ONC MSQ START DATE: NORMAL
RAD ONC MSQ TREATMENT COURSE NUMBER: 1
RAD ONC MSQ TREATMENT SITE: NORMAL

## 2024-06-21 PROCEDURE — 77412 RADIATION TX DELIVERY LVL 3: CPT | Performed by: STUDENT IN AN ORGANIZED HEALTH CARE EDUCATION/TRAINING PROGRAM

## 2024-06-21 PROCEDURE — 77387 GUIDANCE FOR RADJ TX DLVR: CPT | Performed by: STUDENT IN AN ORGANIZED HEALTH CARE EDUCATION/TRAINING PROGRAM

## 2024-06-24 ENCOUNTER — HOSPITAL ENCOUNTER (OUTPATIENT)
Dept: RADIATION ONCOLOGY | Facility: CLINIC | Age: 64
Setting detail: RADIATION/ONCOLOGY SERIES
Discharge: HOME | End: 2024-06-24
Payer: COMMERCIAL

## 2024-06-24 DIAGNOSIS — C77.3 SECONDARY AND UNSPECIFIED MALIGNANT NEOPLASM OF AXILLA AND UPPER LIMB LYMPH NODES (MULTI): ICD-10-CM

## 2024-06-24 DIAGNOSIS — Z51.0 ENCOUNTER FOR ANTINEOPLASTIC RADIATION THERAPY: ICD-10-CM

## 2024-06-24 DIAGNOSIS — C50.412 MALIGNANT NEOPLASM OF UPPER-OUTER QUADRANT OF LEFT FEMALE BREAST (MULTI): ICD-10-CM

## 2024-06-24 LAB
RAD ONC MSQ ACTUAL FRACTIONS DELIVERED: 4
RAD ONC MSQ ACTUAL SESSION DELIVERED DOSE: 250 CGRAY
RAD ONC MSQ ACTUAL TOTAL DOSE: 1000 CGRAY
RAD ONC MSQ ELAPSED DAYS: 5
RAD ONC MSQ LAST DATE: NORMAL
RAD ONC MSQ PRESCRIBED FRACTIONAL DOSE: 250 CGRAY
RAD ONC MSQ PRESCRIBED NUMBER OF FRACTIONS: 4
RAD ONC MSQ PRESCRIBED TECHNIQUE: NORMAL
RAD ONC MSQ PRESCRIBED TOTAL DOSE: 1000 CGRAY
RAD ONC MSQ START DATE: NORMAL
RAD ONC MSQ TREATMENT COURSE NUMBER: 1
RAD ONC MSQ TREATMENT SITE: NORMAL

## 2024-06-24 PROCEDURE — 77387 GUIDANCE FOR RADJ TX DLVR: CPT | Performed by: STUDENT IN AN ORGANIZED HEALTH CARE EDUCATION/TRAINING PROGRAM

## 2024-06-24 PROCEDURE — 77412 RADIATION TX DELIVERY LVL 3: CPT | Performed by: STUDENT IN AN ORGANIZED HEALTH CARE EDUCATION/TRAINING PROGRAM

## 2024-06-26 ENCOUNTER — OFFICE VISIT (OUTPATIENT)
Dept: HEMATOLOGY/ONCOLOGY | Facility: CLINIC | Age: 64
End: 2024-06-26
Payer: COMMERCIAL

## 2024-06-26 VITALS
WEIGHT: 190.7 LBS | SYSTOLIC BLOOD PRESSURE: 133 MMHG | DIASTOLIC BLOOD PRESSURE: 60 MMHG | OXYGEN SATURATION: 95 % | BODY MASS INDEX: 36.24 KG/M2 | HEART RATE: 71 BPM | TEMPERATURE: 97.8 F | RESPIRATION RATE: 16 BRPM

## 2024-06-26 DIAGNOSIS — M85.89 OSTEOPENIA OF MULTIPLE SITES: Primary | ICD-10-CM

## 2024-06-26 DIAGNOSIS — Z17.0 MALIGNANT NEOPLASM OF UPPER-OUTER QUADRANT OF LEFT BREAST IN FEMALE, ESTROGEN RECEPTOR POSITIVE (MULTI): ICD-10-CM

## 2024-06-26 DIAGNOSIS — C50.412 MALIGNANT NEOPLASM OF UPPER-OUTER QUADRANT OF LEFT BREAST IN FEMALE, ESTROGEN RECEPTOR POSITIVE (MULTI): ICD-10-CM

## 2024-06-26 PROCEDURE — 99214 OFFICE O/P EST MOD 30 MIN: CPT | Performed by: INTERNAL MEDICINE

## 2024-06-26 PROCEDURE — 1126F AMNT PAIN NOTED NONE PRSNT: CPT | Performed by: INTERNAL MEDICINE

## 2024-06-26 PROCEDURE — 1159F MED LIST DOCD IN RCRD: CPT | Performed by: INTERNAL MEDICINE

## 2024-06-26 RX ORDER — ANASTROZOLE 1 MG/1
1 TABLET ORAL DAILY
Qty: 30 TABLET | Refills: 2 | Status: SHIPPED | OUTPATIENT
Start: 2024-06-26 | End: 2024-09-24

## 2024-06-26 ASSESSMENT — PAIN SCALES - GENERAL: PAINLEVEL: 0-NO PAIN

## 2024-06-26 ASSESSMENT — COLUMBIA-SUICIDE SEVERITY RATING SCALE - C-SSRS
2. HAVE YOU ACTUALLY HAD ANY THOUGHTS OF KILLING YOURSELF?: NO
1. IN THE PAST MONTH, HAVE YOU WISHED YOU WERE DEAD OR WISHED YOU COULD GO TO SLEEP AND NOT WAKE UP?: NO
6. HAVE YOU EVER DONE ANYTHING, STARTED TO DO ANYTHING, OR PREPARED TO DO ANYTHING TO END YOUR LIFE?: NO

## 2024-06-26 ASSESSMENT — PATIENT HEALTH QUESTIONNAIRE - PHQ9
1. LITTLE INTEREST OR PLEASURE IN DOING THINGS: NOT AT ALL
SUM OF ALL RESPONSES TO PHQ9 QUESTIONS 1 AND 2: 0
2. FEELING DOWN, DEPRESSED OR HOPELESS: NOT AT ALL

## 2024-06-26 NOTE — PROGRESS NOTES
Pt seen in office today for a follow up visit with Dr. Valladares for management of her breast cancer.  She is  without complaints today and denies pain.     Medications, pharmacy preference and allergies were reviewed with patient and updated in the medical record.     Per orders, she is to start on anastrozole. SH has received previous printed info on this and has no current questions.She is  to return to clinic in 2-3 months for a visit with Kathleen Mon MD.    Our contact information was given to patient and they were encouraged to contact us with any questions or concerns.     Patient verbalized understanding and agreement regarding discussed information via verbal feedback. Pt escorted to scheduling.

## 2024-06-30 NOTE — PROGRESS NOTES
DIVISION OF MEDICAL ONCOLOGY    Patient ID: Nidia Smith is a 64 y.o. female.  MRN: [id number]  : 1960  The patient presents to clinic today for her history of left-sided breast cancer.     Cancer Staging   Infiltrating ductal carcinoma of upper-outer quadrant of left breast in female (Multi)  Staging form: Breast, AJCC 8th Edition  - Clinical stage from 2024: Stage IA (cT1, cN0, cM0, G2, ER+, NC+, HER2-) - Unsigned  - Pathologic stage from 3/27/2024: Stage IA (pT2, pN0(i+)(sn), cM0, G2, ER+, NC+, HER2-) - Unsigned    Diagnostic/Therapeutic History:  - 24: Bilateral screening mammogram showed a spiculated mass in the left breast.  - 1/15/24: Diagnostic ultrasound confirmed a 15.9 mm hypoechoic solid mass at 2:00, 12 cm FN, within the left breat. On 24, axillary LN's appeared normal on repeat US.  - 24: US-guided CNB showed IDC, grade 2, ER >95% NC 75% Her2-negative (1+).  - 3/27/24: Left lumpectomy and SLNBx performed. 3.0 cm of grade 2 IDC removed, no LVI, margins negative. Of 3 LN's removed, 1 contained ITC's. No TITO. Oncotype Dx RS 20 (intermediate risk), no chemotherapy benefit.  - Adjuvant RT completed 24 (Dr. Krueger).    History of Present Illness (HPI)/Interval History:  Nidia Smith presents for routine FUV.  She recently completed radiation earlier this week.  She notes some skin burning, redness and some peeling.  Using topical therapies, as dictated by Radiation Oncology.    Review of Systems:  14-point ROS otherwise negative, as per HPI/Interval History.    Past Medical History:   Diagnosis Date    Breast cancer (Multi)     Disease of thyroid gland      Patient Active Problem List   Diagnosis    Other specified hypothyroidism    Hypercholesterolemia    Current mild episode of major depressive disorder without prior episode (CMS-HCC)    Gastroesophageal reflux disease without esophagitis    Mass of upper outer quadrant of left breast    Abnormal finding on breast imaging     Other hyperlipidemia    Obesity with body mass index 30 or greater    Depression with anxiety    Accidental fall    Accessory skin tags    Postmenopausal state    Encounter for screening mammogram for malignant neoplasm of breast    Chronic GERD    Infiltrating ductal carcinoma of upper-outer quadrant of left breast in female (Multi)    Malignant neoplasm of left breast in female, estrogen receptor positive (Multi)        Past Surgical History:   Procedure Laterality Date    BREAST LUMPECTOMY      COLONOSCOPY      CYST REMOVAL      ON NECK       Social History     Socioeconomic History    Marital status:      Spouse name: Not on file    Number of children: Not on file    Years of education: Not on file    Highest education level: Not on file   Occupational History    Not on file   Tobacco Use    Smoking status: Never     Passive exposure: Never    Smokeless tobacco: Never   Vaping Use    Vaping status: Never Used   Substance and Sexual Activity    Alcohol use: Not Currently     Comment: rarely    Drug use: Never    Sexual activity: Defer   Other Topics Concern    Not on file   Social History Narrative    Not on file     Social Determinants of Health     Financial Resource Strain: Low Risk  (4/16/2024)    Overall Financial Resource Strain (CARDIA)     Difficulty of Paying Living Expenses: Not hard at all   Food Insecurity: No Food Insecurity (4/16/2024)    Hunger Vital Sign     Worried About Running Out of Food in the Last Year: Never true     Ran Out of Food in the Last Year: Never true   Transportation Needs: No Transportation Needs (4/16/2024)    PRAPARE - Transportation     Lack of Transportation (Medical): No     Lack of Transportation (Non-Medical): No   Physical Activity: Not on file   Stress: No Stress Concern Present (4/16/2024)    [employer] - Occupational Stress Questionnaire     Feeling of Stress : Only a little   Social Connections: Not on file   Intimate Partner  Violence: Not At Risk (2024)    Humiliation, Afraid, Rape, and Kick questionnaire     Fear of Current or Ex-Partner: No     Emotionally Abused: No     Physically Abused: No     Sexually Abused: No   Housing Stability: Unknown (2024)    Housing Stability Vital Sign     Unable to Pay for Housing in the Last Year: No     Number of Places Lived in the Last Year: Not on file     Unstable Housing in the Last Year: No       Family History   Problem Relation Name Age of Onset    Skin cancer Mother      Coronary artery disease Father      Uterine cancer Sister      Uterine cancer Paternal Grandmother         Allergies:   No Known Allergies      Current Outpatient Medications:     atorvastatin (Lipitor) 40 mg tablet, TAKE 1 TABLET BY MOUTH EVERY DAY, Disp: 30 tablet, Rfl: 8    ERGOCALCIFEROL, VITAMIN D2, ORAL, Take 1 tablet by mouth once daily., Disp: , Rfl:     levothyroxine (Synthroid, Levoxyl) 125 mcg tablet, Take 1 tablet (125 mcg) by mouth once daily., Disp: , Rfl:     sertraline (Zoloft) 100 mg tablet, TABLET BY MOUTH EVERY DAY, Disp: , Rfl:     traMADol (Ultram) 50 mg tablet, Take 1 tablet (50 mg) by mouth every 8 hours if needed for severe pain (7 - 10)., Disp: 15 tablet, Rfl: 0    anastrozole (Arimidex) 1 mg tablet, Take 1 tablet (1 mg total) by mouth once daily.  Swallow whole with a drink of water., Disp: 30 tablet, Rfl: 2     Objective    BSA: 1.93 meters squared  /60 (BP Location: Right arm, Patient Position: Sitting, BP Cuff Size: Adult long)   Pulse 71   Temp 36.6 °C (97.8 °F) (Temporal)   Resp 16   Wt 86.5 kg (190 lb 11.2 oz)   SpO2 95%   BMI 36.24 kg/m²   Wt Readings from Last 3 Encounters:   24 86.5 kg (190 lb 11.2 oz)   24 85.8 kg (189 lb 2.5 oz)   24 86 kg (189 lb 9.5 oz)     ECOG Performance Status:  The ECOG performance scale today is ECO- Fully active, able to carry on all pre-disease performance w/o restriction.    Physical Exam  Vitals reviewed.    Constitutional:       General: She is not in acute distress.     Appearance: Normal appearance. She is not ill-appearing.   HENT:      Head: Normocephalic and atraumatic.   Eyes:      General: No scleral icterus.        Right eye: No discharge.         Left eye: No discharge.   Cardiovascular:      Rate and Rhythm: Normal rate and regular rhythm.      Heart sounds: Normal heart sounds. No murmur heard.  Pulmonary:      Effort: Pulmonary effort is normal. No respiratory distress.      Breath sounds: Normal breath sounds.   Musculoskeletal:      Cervical back: Normal range of motion and neck supple. No rigidity or tenderness.   Lymphadenopathy:      Cervical: No cervical adenopathy.   Skin:     General: Skin is warm and dry.      Coloration: Skin is not jaundiced.      Findings: No rash.   Neurological:      General: No focal deficit present.      Mental Status: She is alert and oriented to person, place, and time. Mental status is at baseline.      Gait: Gait normal.   Psychiatric:         Mood and Affect: Mood normal.         Behavior: Behavior normal.         Thought Content: Thought content normal.         Judgment: Judgment normal.         Laboratory Data:  Lab Results   Component Value Date    WBC 10.2 03/20/2024    HGB 12.9 03/20/2024    HCT 40.1 03/20/2024    MCV 98 03/20/2024     03/20/2024       Chemistry    Lab Results   Component Value Date/Time     03/20/2024 1501    K 4.3 03/20/2024 1501     03/20/2024 1501    CO2 26 03/20/2024 1501    BUN 16 03/20/2024 1501    CREATININE 1.20 03/20/2024 1501    Lab Results   Component Value Date/Time    CALCIUM 9.6 03/20/2024 1501    ALKPHOS 77 01/12/2024 0858    AST 19 01/12/2024 0858    ALT 15 01/12/2024 0858    BILITOT 0.8 01/12/2024 0858        Radiology:  No recent radiology to review.        Assessment/Plan:  Nidia Smith is a 64 y.o. female with a history of left-sided stage IA breast cancer, who presents today for follow-up  evaluation.    #Left breast cancer. Stage IA (pT2 pN0(i+) cM0), grade 2 IDC. ER/NC+, Her2-negative.  S/p lumpectomy, SLNBx and adjuvant RT. Oncotype Dx 20 (intermediate).  Today, we reviewed the benefits of adjuvant endocrine therapy in her case. I recommend at least 5 years of an aromatase inhibitor (or tamoxifen) in order to reduce her risk of cancer recurrence, decrease the chance of a new breast cancer, and improve survival. Potential toxicities of AI therapy were discussed, and she is agreeable to proceed.  - She is healing from acute radiation toxicities.  - She can start anastrozole 1 mg daily in 1-2 weeks. Script sent to pharmacy. Instructed to call with side effects.  - Also discussed role of adjuvant Zometa. She declines this therapy for now.    #Osteopenia. Noted on DEXA from 1/2024.  - On Ca/VitD supplementation.    Disposition.  - RTC 2-3 months. Care will transition to Dr. Mon.  - She has our contact information and was instructed to call with concerns/questions in the interim.    Jason Franks MD  Hematology and Medical Oncology  [facility]

## 2024-07-09 NOTE — PROGRESS NOTES
Radiation Oncology Treatment Summary    Patient Name:  Nidia Smith  MRN:  [id number]  :  1960    Referring Provider: No ref. provider found  Primary Care Provider: Jason Irby MD    Brief History: Nidia Smith is a 64 y.o. female with Infiltrating ductal carcinoma of upper-outer quadrant of left breast in female (Multi), Clinical: Stage IA (cT1, cN0, cM0, G2, ER+, VA+, HER2-)  Infiltrating ductal carcinoma of upper-outer quadrant of left breast in female (Multi), Pathologic: Stage IA (pT2, pN0(i+)(sn), cM0, G2, ER+, VA+, HER2-).  The patient completed radiotherapy as outlined below.    Radiation Treatment Summary:    3D CRT: Left Upper outer quadrant of breast    Treatment Period Technique Fraction Dose Fractions Total Dose   Course 1 2024-2024  (days elapsed: 27)         L breast 2024-2024 3D 266 / 266 cGy  4256 / 4,256 cGy         L breast boost 2024-2024 3-Field Boost 250 / 250 cGy  1000 / 1,000 cGy       Please see the patient's Mos[facility] chart for further details regarding the radiation plan, including beam energy.    Concurrent Chemotherapy:  Treatment Plans       No treatment plans exist          CTCAE Toxicity Overview:   Toxicity Assessment          2024    15:04 2024    15:19 2024    15:35 2024    15:50 2024    15:31   Toxicity Assessment   Treatment Site Breast Breast Breast Breast Breast   Anorexia Grade 0 Grade 0  Grade 0 Grade 0   Anxiety Grade 0 Grade 0  Grade 0 Grade 0   Dehydration Grade 0 Grade 0  Grade 0 Grade 0   Depression Grade 0 Grade 0  Grade 0 Grade 0   Dermatitis Radiation Grade 0       Patient instructed to use Udderly Smooth BID Grade 0       using Udderly smooth BID Grade 1       slight redness to left breast Grade 2       redness to left breast and underarm, using udderly smooth Grade 2       open area under left breast   Diarrhea Grade 0 Grade 0  Grade 0 Grade 0   Fatigue Grade 0 Grade 0  Grade 0 Grade 0    Fibrosis Deep Connective Tissue Grade 0 Grade 0  Grade 0 Grade 0   Fracture Grade 0 Grade 0  Grade 0 Grade 0   Nausea Grade 0 Grade 0  Grade 0 Grade 0   Pain Grade 0 Grade 0  Grade 0 Grade 0   Treatment Related Secondary Malignancy Grade 0 Grade 0  Grade 0 Grade 0   Tumor Pain Grade 0 Grade 0  Grade 0 Grade 0   Vomiting Grade 0 Grade 0  Grade 0 Grade 0   Abdominal Pain Grade 0 Grade 0   Grade 0   Dysphagia Grade 0 Grade 0   Grade 0   Esophagitis Grade 0 Grade 0   Grade 0   Gastric Hemorrhage Grade 0 Grade 0   Grade 0   Mucositis Oral Grade 0 Grade 0   Grade 0   Dry Mouth Grade 0 Grade 0  Grade 0 Grade 0   Breast Infection Grade 0 Grade 0  Grade 0 Grade 0   Seroma Grade 0 Grade 0  Grade 0 Grade 0   Joint Range of Motion Decreased Grade 0 Grade 0  Grade 0 Grade 0   Joint Range of Motion Decreased Lumbar Spine Grade 0 Grade 0  Grade 0 Grade 0   Brachial Plexopathy Grade 0 Grade 0  Grade 0 Grade 0   Breast Atrophy Grade 0 Grade 0  Grade 0 Grade 0   Breast Pain Grade 0 Grade 0  Grade 0 Grade 0   Nipple Deformity Grade 0 Grade 0  Grade 0 Grade 0   Pneumonitis Grade 0 Grade 0  Grade 0 Grade 0   Edema Limbs Grade 0 Grade 0  Grade 0 Grade 0   Lymphedema Grade 0 Grade 0  Grade 0 Grade 0   Thromboembolic Event Grade 0 Grade 0  Grade 0 Grade 0   Hot Flashes Grade 1       very rare Grade 0  Grade 0 Grade 0     Patient Disposition: [facility] 8/7/24 @ 3303

## 2024-07-15 LAB
AP SUMMARY REPORT: NORMAL
SCAN RESULT: NORMAL

## 2024-07-18 ENCOUNTER — LAB (OUTPATIENT)
Dept: LAB | Facility: LAB | Age: 64
End: 2024-07-18
Payer: COMMERCIAL

## 2024-07-18 DIAGNOSIS — E78.00 HYPERCHOLESTEROLEMIA: ICD-10-CM

## 2024-07-18 DIAGNOSIS — E03.9 HYPOTHYROIDISM, UNSPECIFIED TYPE: ICD-10-CM

## 2024-07-18 DIAGNOSIS — E03.8 OTHER SPECIFIED HYPOTHYROIDISM: ICD-10-CM

## 2024-07-18 LAB
ALBUMIN SERPL-MCNC: 4.3 G/DL (ref 3.5–5)
ALP BLD-CCNC: 80 U/L (ref 35–125)
ALT SERPL-CCNC: 16 U/L (ref 5–40)
AST SERPL-CCNC: 17 U/L (ref 5–40)
BILIRUB DIRECT SERPL-MCNC: <0.2 MG/DL (ref 0–0.2)
BILIRUB SERPL-MCNC: 0.8 MG/DL (ref 0.1–1.2)
CHOLEST SERPL-MCNC: 235 MG/DL (ref 133–200)
CHOLEST/HDLC SERPL: 3.3 {RATIO}
HDLC SERPL-MCNC: 72 MG/DL
LDLC SERPL CALC-MCNC: 141 MG/DL (ref 65–130)
PROT SERPL-MCNC: 6.8 G/DL (ref 5.9–7.9)
T4 FREE SERPL-MCNC: 1.9 NG/DL (ref 0.9–1.7)
TRIGL SERPL-MCNC: 112 MG/DL (ref 40–150)
TSH SERPL DL<=0.05 MIU/L-ACNC: 26.75 MIU/L (ref 0.27–4.2)

## 2024-07-18 PROCEDURE — 84443 ASSAY THYROID STIM HORMONE: CPT

## 2024-07-18 PROCEDURE — 84439 ASSAY OF FREE THYROXINE: CPT

## 2024-07-18 PROCEDURE — 36415 COLL VENOUS BLD VENIPUNCTURE: CPT

## 2024-07-18 PROCEDURE — 80076 HEPATIC FUNCTION PANEL: CPT

## 2024-07-18 PROCEDURE — 80061 LIPID PANEL: CPT

## 2024-07-22 ENCOUNTER — APPOINTMENT (OUTPATIENT)
Dept: PRIMARY CARE | Facility: CLINIC | Age: 64
End: 2024-07-22
Payer: COMMERCIAL

## 2024-07-24 ENCOUNTER — DOCUMENTATION (OUTPATIENT)
Dept: RADIATION ONCOLOGY | Facility: CLINIC | Age: 64
End: 2024-07-24
Payer: COMMERCIAL

## 2024-07-25 ENCOUNTER — TELEPHONE (OUTPATIENT)
Dept: PRIMARY CARE | Facility: CLINIC | Age: 64
End: 2024-07-25

## 2024-07-25 ENCOUNTER — APPOINTMENT (OUTPATIENT)
Dept: PRIMARY CARE | Facility: CLINIC | Age: 64
End: 2024-07-25
Payer: COMMERCIAL

## 2024-07-25 VITALS
DIASTOLIC BLOOD PRESSURE: 80 MMHG | OXYGEN SATURATION: 96 % | RESPIRATION RATE: 18 BRPM | WEIGHT: 185 LBS | HEART RATE: 69 BPM | BODY MASS INDEX: 35.15 KG/M2 | SYSTOLIC BLOOD PRESSURE: 110 MMHG

## 2024-07-25 DIAGNOSIS — E78.00 HYPERCHOLESTEROLEMIA: ICD-10-CM

## 2024-07-25 DIAGNOSIS — E03.8 OTHER SPECIFIED HYPOTHYROIDISM: ICD-10-CM

## 2024-07-25 DIAGNOSIS — E03.8 OTHER SPECIFIED HYPOTHYROIDISM: Primary | ICD-10-CM

## 2024-07-25 DIAGNOSIS — Z00.00 ANNUAL PHYSICAL EXAM: Primary | ICD-10-CM

## 2024-07-25 DIAGNOSIS — C50.412 INFILTRATING DUCTAL CARCINOMA OF UPPER-OUTER QUADRANT OF LEFT BREAST IN FEMALE (MULTI): ICD-10-CM

## 2024-07-25 DIAGNOSIS — F41.8 DEPRESSION WITH ANXIETY: ICD-10-CM

## 2024-07-25 PROCEDURE — 1158F ADVNC CARE PLAN TLK DOCD: CPT | Performed by: FAMILY MEDICINE

## 2024-07-25 PROCEDURE — 1123F ACP DISCUSS/DSCN MKR DOCD: CPT | Performed by: FAMILY MEDICINE

## 2024-07-25 PROCEDURE — 1036F TOBACCO NON-USER: CPT | Performed by: FAMILY MEDICINE

## 2024-07-25 PROCEDURE — 1159F MED LIST DOCD IN RCRD: CPT | Performed by: FAMILY MEDICINE

## 2024-07-25 PROCEDURE — 1126F AMNT PAIN NOTED NONE PRSNT: CPT | Performed by: FAMILY MEDICINE

## 2024-07-25 PROCEDURE — 99214 OFFICE O/P EST MOD 30 MIN: CPT | Performed by: FAMILY MEDICINE

## 2024-07-25 RX ORDER — LEVOTHYROXINE SODIUM 100 UG/1
100 TABLET ORAL DAILY
Qty: 90 TABLET | Refills: 3 | Status: SHIPPED | OUTPATIENT
Start: 2024-07-25 | End: 2025-07-25

## 2024-07-25 ASSESSMENT — ENCOUNTER SYMPTOMS
LOSS OF SENSATION IN FEET: 0
DEPRESSION: 0
OCCASIONAL FEELINGS OF UNSTEADINESS: 0

## 2024-07-25 ASSESSMENT — PAIN SCALES - GENERAL: PAINLEVEL: 0-NO PAIN

## 2024-07-25 NOTE — PROGRESS NOTES
Subjective   Patient ID: Libby Smith is a 64 y.o. female who presents for Hyperlipidemia (Patient is here for a CHOL check) and Hypothyroidism (Patient is here for a TSH check).    HPI   1. LIBBY is seen for follow up of Hypothyroidism.  She is on Synthroid (name brand) 150 mcg.  Recent TSH is elevated with an elevated  thyroxine of 1.9     2. LIBBY is seen for also for for breast CA.   She underwent lumpectomy by Dr. Sanchez in 4/24.  This was followed by radiation treatments.She is followed by oncology and Dr. Sanchez.     3. LIBBY is seen today also for follow up of Hypercholesterolemia.  She is on atorvastatin 40 (started 9/23). Recent LDL is 141 .      4. LIBBY is seen today also for follow up of depression.  She is doing well on Zoloft 100mg.    Review of Systems  Denies headache, blurred vision, chest pain, shortness of breath, nausea or vomiting, change in bowel habits or leg pain or swelling.    Objective   /80 (BP Location: Left arm, Patient Position: Sitting, BP Cuff Size: Large adult)   Pulse 69   Resp 18   Wt 83.9 kg (185 lb)   SpO2 96%   BMI 35.15 kg/m²     Physical Exam  Vitals and nurse's notes reviewed  General: no acute distress  HEENT: Normal  Neck: Supple  Lungs: Clear  Cardio: RRR w/o murmur  Extremities: No edema, no calf tenderness  Neuro: Alert and oriented with no focal deficits    Assessment/Plan   Problem List Items Addressed This Visit             ICD-10-CM       High    Other specified hypothyroidism - Primary E03.8     Will decrease levothyroxine from 1 .  Recheck TSH in 6 months.         Relevant Medications    levothyroxine (Synthroid, Levoxyl) 100 mcg tablet    Hypercholesterolemia E78.00     Continue atorvastatin.  Improve diet.  She admits to eating poorly over the past several months due to stress.  She is motivated to improve her diet.  Will recheck in 6 months at CPE.         Depression with anxiety F41.8     Continue sertraline.  Recheck in 6 months.          Infiltrating ductal carcinoma of upper-outer quadrant of left breast in female (Multi) C50.412     Continue following with Dr. Sanchez and oncology.

## 2024-07-25 NOTE — ASSESSMENT & PLAN NOTE
Continue atorvastatin.  Improve diet.  She admits to eating poorly over the past several months due to stress.  She is motivated to improve her diet.  Will recheck in 6 months at CPE.

## 2024-08-07 ENCOUNTER — HOSPITAL ENCOUNTER (OUTPATIENT)
Dept: RADIATION ONCOLOGY | Facility: CLINIC | Age: 64
Setting detail: RADIATION/ONCOLOGY SERIES
Discharge: HOME | End: 2024-08-07
Payer: COMMERCIAL

## 2024-08-07 VITALS
TEMPERATURE: 86 F | DIASTOLIC BLOOD PRESSURE: 76 MMHG | OXYGEN SATURATION: 99 % | WEIGHT: 189.04 LBS | BODY MASS INDEX: 35.92 KG/M2 | HEART RATE: 69 BPM | SYSTOLIC BLOOD PRESSURE: 145 MMHG | RESPIRATION RATE: 18 BRPM

## 2024-08-07 DIAGNOSIS — Z17.0 MALIGNANT NEOPLASM OF LEFT BREAST IN FEMALE, ESTROGEN RECEPTOR POSITIVE, UNSPECIFIED SITE OF BREAST (MULTI): ICD-10-CM

## 2024-08-07 DIAGNOSIS — C50.912 MALIGNANT NEOPLASM OF LEFT BREAST IN FEMALE, ESTROGEN RECEPTOR POSITIVE, UNSPECIFIED SITE OF BREAST (MULTI): ICD-10-CM

## 2024-08-07 PROCEDURE — 99211 OFF/OP EST MAY X REQ PHY/QHP: CPT | Performed by: STUDENT IN AN ORGANIZED HEALTH CARE EDUCATION/TRAINING PROGRAM

## 2024-08-07 ASSESSMENT — PAIN SCALES - GENERAL: PAINLEVEL: 0-NO PAIN

## 2024-08-07 ASSESSMENT — LIFESTYLE VARIABLES
HOW OFTEN DO YOU HAVE SIX OR MORE DRINKS ON ONE OCCASION: NEVER
SKIP TO QUESTIONS 9-10: 1
HOW OFTEN DO YOU HAVE A DRINK CONTAINING ALCOHOL: NEVER
AUDIT-C TOTAL SCORE: 0
HOW MANY STANDARD DRINKS CONTAINING ALCOHOL DO YOU HAVE ON A TYPICAL DAY: PATIENT DOES NOT DRINK

## 2024-08-07 ASSESSMENT — ENCOUNTER SYMPTOMS
DEPRESSION: 0
LOSS OF SENSATION IN FEET: 0
OCCASIONAL FEELINGS OF UNSTEADINESS: 0

## 2024-08-07 NOTE — PROGRESS NOTES
Radiation Oncology Follow-Up    Patient Name:  Nidia Smith  MRN:  [id number]  :  1960    Referring Provider: Shaila Krueger DO  Primary Care Provider: Jason Irby MD  Care Team: Patient Care Team:  Jason rIby MD as PCP - General (Family Medicine)  Jason Irby MD as Primary Care Provider  Jason Franks MD as Consulting Physician (Hematology and Oncology)    Date of Service: 2024    Diagnosis: Infiltrating ductal carcinoma of upper-outer quadrant of left breast in female (Multi), Clinical: Stage IA (cT1, cN0, cM0, G2, ER+, MT+, HER2-)  Infiltrating ductal carcinoma of upper-outer quadrant of left breast in female (Multi), Pathologic: Stage IA (pT2, pN0(i+)(sn), cM0, G2, ER+, MT+, HER2-)    Previous treatment:  3/27/2024: left breast lumpectomy and sentinel node biopsy  2024: Completed adjuvant left whole breast radiation followed by lumpectomy bed boost      History of Present Illness:  Ms. Smith is a 64 y.o. woman, who returns to radiation oncology clinic today for follow-up after completing left whole breast radiation, as below. She tolerated treatment well, with some very mild dry desquamation in the weeks following treatment, which resolved with topical moisturizers. She denies any breast pain or discomfort. She denies any breast masses or lesions. She has been tolerating her anastrozole without any significant difficulties.     Treatment Rendered:   3D CRT: Left Upper outer quadrant of breast    Treatment Period Technique Fraction Dose Fractions Total Dose   Course 1 2024-2024  (days elapsed: )         L breast 2024-2024 3D 266 / 266 cGy  4256 / 4,256 cGy         L breast boost 2024-2024 3-Field Boost 250 / 250 cGy  1000 / 1,000 cGy       Review of Systems:   Review of Systems - Oncology    Performance Status:   The Karnofsky performance scale today is 90, Able to carry on normal activity; minor signs or symptoms of disease (ECOG  equivalent 0).       OBJECTIVE  Vital Signs:  /76   Pulse 69   Temp (!) 30 °C (86 °F)   Resp 18   Wt 85.7 kg (189 lb 0.7 oz)   SpO2 99%   BMI 35.92 kg/m²   Physical Exam  Vitals reviewed. Exam conducted with a chaperone present.   Constitutional:       General: She is not in acute distress.     Appearance: Normal appearance. She is not ill-appearing.   HENT:      Head: Normocephalic and atraumatic.      Right Ear: External ear normal.      Left Ear: External ear normal.      Mouth/Throat:      Mouth: Mucous membranes are moist.      Pharynx: Oropharynx is clear.   Eyes:      Conjunctiva/sclera: Conjunctivae normal.   Cardiovascular:      Rate and Rhythm: Normal rate.   Pulmonary:      Effort: Pulmonary effort is normal. No respiratory distress.   Chest:   Breasts:     Right: No swelling, bleeding, mass, skin change or tenderness.      Left: No swelling, bleeding, mass, skin change or tenderness.      Comments: The breasts were examined in the supine and upright positions, and are overall symmetrical in appearance. The left breast has a well-healed lumpectomy scar with mildly palpable underlying fibrosis. There is faint hyperpigmentation of the breast, without desquamation. Very mild edema of the left breast. Mild dry skin noted around the breast and arms as well. Otherwise, the remainder of the breast exam exhibits normal breast tissue in the right and left breasts, without any discrete firmness, nodularity, or lesions to palpation. There is no other tenderness or palpable masses. The overlying skin is otherwise normal. There is no appreciable axillary lymphadenopathy in the right or left axilla.   Abdominal:      General: There is no distension.   Musculoskeletal:         General: Normal range of motion.      Cervical back: Normal range of motion and neck supple.   Lymphadenopathy:      Upper Body:      Right upper body: No axillary adenopathy.      Left upper body: No axillary adenopathy.   Skin:      General: Skin is warm and dry.   Neurological:      Mental Status: She is alert and oriented to person, place, and time.   Psychiatric:         Mood and Affect: Mood normal.         Behavior: Behavior normal.            ASSESSMENT:   Ms. Smith is a 64 y.o. woman with a diagnosis of Infiltrating ductal carcinoma of upper-outer quadrant of left breast in female (Multi), Clinical: Stage IA (cT1, cN0, cM0, G2, ER+, MT+, HER2-)  Infiltrating ductal carcinoma of upper-outer quadrant of left breast in female (Multi), Pathologic: Stage IA (pT2, pN0(i+)(sn), cM0, G2, ER+, MT+, HER2-), status post lumpectomy and sentinel node biopsy, followed by left whole breast radiation. She tolerated treatment well, with only some mild residual skin hyperpigmentation.    PLAN:  She will continue on her anastrozole, and will have close follow-up with her medical oncologist. She also has follow-up already scheduled with her surgeon, who will coordinate her mammograms. Given her overall low risk disease, and excellent response to treatment and recovery, we discussed that she may continue to follow-up with just her medical oncologist and surgeon going forward, and return to see us only on an as-needed basis in the future. She was in agreement with this plan, though is certainly welcome to contact us at any point if any additional concerns arise.  NCCN Guidelines were applicable to guide this patients treatment plan.  Shaila Krueger DO

## 2024-08-07 NOTE — PROGRESS NOTES
Patient here today for follow up with Dr. Krueger for left breast cancer. Patient completed 20 fractions of radiation on 6/24/24. Patient states that she did have some skin peeling and is occasionally using U[place]der[place] Smooth to the area. She does have a patch of dry skin to the upper breast. Per Dr. Krueger, patient to follow up as needed but continue to follw up with all her other physicians. Patient verbalizes understanding with verbal teach back. Pati Montiel MSN, RN, OCN

## 2024-08-07 NOTE — ADDENDUM NOTE
Encounter addended by: Pati Montiel RN on: 8/7/2024 11:36 AM   Actions taken: Clinical Note Signed, Patient Education assessment filed, Patient Education documented on

## 2024-08-20 ENCOUNTER — OFFICE VISIT (OUTPATIENT)
Dept: HEMATOLOGY/ONCOLOGY | Facility: CLINIC | Age: 64
End: 2024-08-20
Payer: COMMERCIAL

## 2024-08-20 VITALS
RESPIRATION RATE: 16 BRPM | DIASTOLIC BLOOD PRESSURE: 84 MMHG | BODY MASS INDEX: 36.66 KG/M2 | TEMPERATURE: 96.9 F | WEIGHT: 192.9 LBS | HEART RATE: 61 BPM | SYSTOLIC BLOOD PRESSURE: 143 MMHG | OXYGEN SATURATION: 94 %

## 2024-08-20 DIAGNOSIS — Z91.89 AT HIGH RISK FOR OSTEOPOROSIS: ICD-10-CM

## 2024-08-20 DIAGNOSIS — C50.412 INFILTRATING DUCTAL CARCINOMA OF UPPER-OUTER QUADRANT OF LEFT BREAST IN FEMALE (MULTI): Primary | ICD-10-CM

## 2024-08-20 DIAGNOSIS — Z17.0 MALIGNANT NEOPLASM OF UPPER-OUTER QUADRANT OF LEFT BREAST IN FEMALE, ESTROGEN RECEPTOR POSITIVE (MULTI): ICD-10-CM

## 2024-08-20 DIAGNOSIS — C50.412 MALIGNANT NEOPLASM OF UPPER-OUTER QUADRANT OF LEFT BREAST IN FEMALE, ESTROGEN RECEPTOR POSITIVE (MULTI): ICD-10-CM

## 2024-08-20 PROCEDURE — 99214 OFFICE O/P EST MOD 30 MIN: CPT | Performed by: STUDENT IN AN ORGANIZED HEALTH CARE EDUCATION/TRAINING PROGRAM

## 2024-08-20 PROCEDURE — 1126F AMNT PAIN NOTED NONE PRSNT: CPT | Performed by: STUDENT IN AN ORGANIZED HEALTH CARE EDUCATION/TRAINING PROGRAM

## 2024-08-20 PROCEDURE — 1160F RVW MEDS BY RX/DR IN RCRD: CPT | Performed by: STUDENT IN AN ORGANIZED HEALTH CARE EDUCATION/TRAINING PROGRAM

## 2024-08-20 PROCEDURE — 1123F ACP DISCUSS/DSCN MKR DOCD: CPT | Performed by: STUDENT IN AN ORGANIZED HEALTH CARE EDUCATION/TRAINING PROGRAM

## 2024-08-20 PROCEDURE — 1159F MED LIST DOCD IN RCRD: CPT | Performed by: STUDENT IN AN ORGANIZED HEALTH CARE EDUCATION/TRAINING PROGRAM

## 2024-08-20 RX ORDER — HEPARIN SODIUM,PORCINE/PF 10 UNIT/ML
50 SYRINGE (ML) INTRAVENOUS AS NEEDED
OUTPATIENT
Start: 2024-08-20

## 2024-08-20 RX ORDER — FAMOTIDINE 10 MG/ML
20 INJECTION INTRAVENOUS ONCE AS NEEDED
OUTPATIENT
Start: 2024-09-17

## 2024-08-20 RX ORDER — DIPHENHYDRAMINE HYDROCHLORIDE 50 MG/ML
50 INJECTION INTRAMUSCULAR; INTRAVENOUS AS NEEDED
OUTPATIENT
Start: 2024-09-17

## 2024-08-20 RX ORDER — EPINEPHRINE 0.3 MG/.3ML
0.3 INJECTION SUBCUTANEOUS EVERY 5 MIN PRN
OUTPATIENT
Start: 2024-09-17

## 2024-08-20 RX ORDER — HEPARIN 100 UNIT/ML
500 SYRINGE INTRAVENOUS AS NEEDED
OUTPATIENT
Start: 2024-08-20

## 2024-08-20 RX ORDER — ALBUTEROL SULFATE 0.83 MG/ML
3 SOLUTION RESPIRATORY (INHALATION) AS NEEDED
OUTPATIENT
Start: 2024-09-17

## 2024-08-20 ASSESSMENT — PAIN SCALES - GENERAL: PAINLEVEL: 0-NO PAIN

## 2024-08-20 ASSESSMENT — PATIENT HEALTH QUESTIONNAIRE - PHQ9
2. FEELING DOWN, DEPRESSED OR HOPELESS: NOT AT ALL
1. LITTLE INTEREST OR PLEASURE IN DOING THINGS: NOT AT ALL
SUM OF ALL RESPONSES TO PHQ9 QUESTIONS 1 AND 2: 0

## 2024-08-20 NOTE — PROGRESS NOTES
DIVISION OF MEDICAL ONCOLOGY    Patient ID: Nidia Smith is a 64 y.o. female.  MRN: [id number]  : 1960  The patient presents to clinic today for her history of left-sided breast cancer.     Cancer Staging   Infiltrating ductal carcinoma of upper-outer quadrant of left breast in female (Multi)  Staging form: Breast, AJCC 8th Edition  - Clinical stage from 2024: Stage IA (cT1, cN0, cM0, G2, ER+, MN+, HER2-) - Unsigned  - Pathologic stage from 3/27/2024: Stage IA (pT2, pN0(i+)(sn), cM0, G2, ER+, MN+, HER2-) - Unsigned    Diagnostic/Therapeutic History:  - 24: Bilateral screening mammogram showed a spiculated mass in the left breast.  - 1/15/24: Diagnostic ultrasound confirmed a 15.9 mm hypoechoic solid mass at 2:00, 12 cm FN, within the left breat. On 24, axillary LN's appeared normal on repeat US.  - 24: US-guided CNB showed IDC, grade 2, ER >95% MN 75% Her2-negative (1+).  - 3/27/24: Left lumpectomy and SLNBx performed. 3.0 cm of grade 2 IDC removed, no LVI, margins negative. Of 3 LN's removed, 1 contained ITC's. No TITO. Oncotype Dx RS 20 (intermediate risk), no chemotherapy benefit.  - Adjuvant RT completed 24 (Dr. Krueger).  - Started adjuvant anastrozole 2024    History of Present Illness (HPI)/Interval History:  Nidia Smith presents for routine FUV.  She has been on anastrozole since July and has dry skin but no other major side effects. No arthralgias.    Review of Systems:  14-point ROS otherwise negative, as per HPI/Interval History.    I have personally reviewed PMH, PSH, Family History in the HISTORY tab of this chart, and unless noted above are not pertinent to the presenting complaint.  I have personally reviewed Social History as provided in the electronic medical record.    Family History  Sister had uterine cancer age 39 and underwent radical hysterectomy    Allergies:   No Known Allergies    Current Outpatient Medications   Medication Instructions    anastrozole  (ARIMIDEX) 1 mg, oral, Daily, Swallow whole with a drink of water.    atorvastatin (LIPITOR) 40 mg, oral, Daily    ERGOCALCIFEROL, VITAMIN D2, ORAL 1 tablet, oral, Daily    levothyroxine (SYNTHROID, LEVOXYL) 100 mcg, oral, Daily, Take on an empty stomach at the same time each day, either 30 to 60 minutes prior to breakfast    sertraline (Zoloft) 100 mg tablet TABLET BY MOUTH EVERY DAY    traMADol (ULTRAM) 50 mg, oral, Every 8 hours PRN       Objective    BSA: 1.94 meters squared  /84 (BP Location: Right arm, Patient Position: Sitting, BP Cuff Size: Adult long)   Pulse 61   Temp 36.1 °C (96.9 °F) (Temporal)   Resp 16   Wt 87.5 kg (192 lb 14.4 oz) Comment: shoes on  SpO2 94%   BMI 36.66 kg/m²   Wt Readings from Last 3 Encounters:   24 85.7 kg (189 lb 0.7 oz)   24 83.9 kg (185 lb)   24 86.5 kg (190 lb 11.2 oz)     ECOG Performance Status:  The ECOG performance scale today is ECO- Fully active, able to carry on all pre-disease performance w/o restriction.    General: Pleasant woman, appears stated age, well-groomed in street clothes,  awake/alert/oriented x3, no distress, alert and cooperative  Head: Hair fully covering scalp. Symmetric facial expressions  Eyes: PERRL, EOMI, clear sclera, eyebrows present.  Ears/Nose/Mouth/Throat:  Oral mucous membranes moist. No oral ulcers. No palpable pre/post-auricular lymph nodes  Neck: No palpable cervical chain lymph nodes  Respiratory: unlabored breathing on room air, good air movement  Breast: not examined today, per patient preference  Cardio: Regular rate and rhythm, normal S1 and S2, radial pulses symmetric  GI: Nondistended, soft, non-tender abdomen  Musculoskeletal: Normal muscle bulk and tone, ROM intact, no joint swelling.  Rises from chair and walks unassisted.  Extremities: No ankle swelling, no arm or leg wounds  Neuro: Alert, cognition intact, speech normal. Facial expressions symmetric.  No motor deficits noted. Sensation intact to  touch and hot/cold.   Able to stand from seated position unassisted and walks around the room unassisted.  Psychological: Appropriate mood and behavior.  Skin: Warm and dry, left forearm with an erythematous skin lesion (present for many years per patient)    Laboratory Data:  No new labs for this visit.    Baseline Creatinine 1.2 in March 2024    Radiology:  No recent radiology to review.    Assessment/Plan:  Nidia Smith is a 64 y.o. female with a history of left-sided stage IA breast cancer, who presents today for follow-up evaluation.    #Left breast cancer. Stage IA (pT2 pN0(i+) cM0), grade 2 IDC. ER/TN+, Her2-negative.  S/p lumpectomy, SLNBx and adjuvant RT. Oncotype Dx 20 (intermediate).  6/26/24 - Golden Grace reviewed the benefits of adjuvant endocrine therapy in her case. Recommends at least 5 years of an aromatase inhibitor in order to reduce her risk of cancer recurrence, decrease the chance of a new breast cancer, and improve survival. Potential toxicities of AI therapy were discussed, and she is agreeable to proceed.  - She is healing from acute radiation toxicities.  - She started anastrozole 1 mg daily since July 2024 with no intolerable side effects.  - Also discussed role of adjuvant Zometa 4mg every 6 months for total of 3 years. She previously declined bisphosphonate therapy but today 8/20 states she is open to it. Will start next month.    #Osteopenia. Noted on DEXA from 1/2024.  - On Ca/VitD supplementation.    Disposition.  - RTC in 3- 6 months. She can call sooner if symptoms arise and we can schedule an appointment.  - Follow with Dr. Sanchez for mammograms and breast exams - next in Jan 2025.      Kathleen Mon MD  Medical Oncology  [facility]

## 2024-09-01 DIAGNOSIS — F41.8 DEPRESSION WITH ANXIETY: ICD-10-CM

## 2024-09-03 RX ORDER — SERTRALINE HYDROCHLORIDE 100 MG/1
TABLET, FILM COATED ORAL
Qty: 30 TABLET | Refills: 5 | Status: SHIPPED | OUTPATIENT
Start: 2024-09-03

## 2024-09-05 ENCOUNTER — TELEPHONE (OUTPATIENT)
Dept: HEMATOLOGY/ONCOLOGY | Facility: CLINIC | Age: 64
End: 2024-09-05
Payer: COMMERCIAL

## 2024-09-05 DIAGNOSIS — C50.412 MALIGNANT NEOPLASM OF UPPER-OUTER QUADRANT OF LEFT BREAST IN FEMALE, ESTROGEN RECEPTOR POSITIVE (MULTI): ICD-10-CM

## 2024-09-05 DIAGNOSIS — Z17.0 MALIGNANT NEOPLASM OF UPPER-OUTER QUADRANT OF LEFT BREAST IN FEMALE, ESTROGEN RECEPTOR POSITIVE (MULTI): ICD-10-CM

## 2024-09-05 RX ORDER — ANASTROZOLE 1 MG/1
1 TABLET ORAL DAILY
Qty: 30 TABLET | Refills: 2 | Status: SHIPPED | OUTPATIENT
Start: 2024-09-05 | End: 2024-12-04

## 2024-09-17 ENCOUNTER — INFUSION (OUTPATIENT)
Dept: HEMATOLOGY/ONCOLOGY | Facility: CLINIC | Age: 64
End: 2024-09-17
Payer: COMMERCIAL

## 2024-09-17 VITALS
BODY MASS INDEX: 36.32 KG/M2 | WEIGHT: 191.14 LBS | TEMPERATURE: 97.2 F | RESPIRATION RATE: 18 BRPM | HEART RATE: 78 BPM | SYSTOLIC BLOOD PRESSURE: 152 MMHG | OXYGEN SATURATION: 97 % | DIASTOLIC BLOOD PRESSURE: 77 MMHG

## 2024-09-17 DIAGNOSIS — Z91.89 AT HIGH RISK FOR OSTEOPOROSIS: ICD-10-CM

## 2024-09-17 DIAGNOSIS — C50.412 INFILTRATING DUCTAL CARCINOMA OF UPPER-OUTER QUADRANT OF LEFT BREAST IN FEMALE: ICD-10-CM

## 2024-09-17 LAB
ALBUMIN SERPL BCP-MCNC: 4.3 G/DL (ref 3.4–5)
ALP SERPL-CCNC: 57 U/L (ref 33–136)
ALT SERPL W P-5'-P-CCNC: 20 U/L (ref 7–45)
ANION GAP SERPL CALC-SCNC: 14 MMOL/L (ref 10–20)
AST SERPL W P-5'-P-CCNC: 23 U/L (ref 9–39)
BILIRUB SERPL-MCNC: 0.8 MG/DL (ref 0–1.2)
BUN SERPL-MCNC: 13 MG/DL (ref 6–23)
CALCIUM SERPL-MCNC: 9.4 MG/DL (ref 8.6–10.3)
CHLORIDE SERPL-SCNC: 104 MMOL/L (ref 98–107)
CO2 SERPL-SCNC: 27 MMOL/L (ref 21–32)
CREAT SERPL-MCNC: 0.9 MG/DL (ref 0.5–1.05)
EGFRCR SERPLBLD CKD-EPI 2021: 71 ML/MIN/1.73M*2
GLUCOSE SERPL-MCNC: 94 MG/DL (ref 74–99)
MAGNESIUM SERPL-MCNC: 1.93 MG/DL (ref 1.6–2.4)
PHOSPHATE SERPL-MCNC: 3.5 MG/DL (ref 2.5–4.9)
POTASSIUM SERPL-SCNC: 3.4 MMOL/L (ref 3.5–5.3)
PROT SERPL-MCNC: 7 G/DL (ref 6.4–8.2)
SODIUM SERPL-SCNC: 142 MMOL/L (ref 136–145)

## 2024-09-17 PROCEDURE — 96365 THER/PROPH/DIAG IV INF INIT: CPT | Mod: INF

## 2024-09-17 PROCEDURE — 83735 ASSAY OF MAGNESIUM: CPT

## 2024-09-17 PROCEDURE — 2500000004 HC RX 250 GENERAL PHARMACY W/ HCPCS (ALT 636 FOR OP/ED): Performed by: STUDENT IN AN ORGANIZED HEALTH CARE EDUCATION/TRAINING PROGRAM

## 2024-09-17 PROCEDURE — 80053 COMPREHEN METABOLIC PANEL: CPT

## 2024-09-17 PROCEDURE — 84100 ASSAY OF PHOSPHORUS: CPT

## 2024-09-17 RX ORDER — DIPHENHYDRAMINE HYDROCHLORIDE 50 MG/ML
50 INJECTION INTRAMUSCULAR; INTRAVENOUS AS NEEDED
Status: DISCONTINUED | OUTPATIENT
Start: 2024-09-17 | End: 2024-09-17 | Stop reason: HOSPADM

## 2024-09-17 RX ORDER — FAMOTIDINE 10 MG/ML
20 INJECTION INTRAVENOUS ONCE AS NEEDED
OUTPATIENT
Start: 2025-03-04

## 2024-09-17 RX ORDER — ZOLEDRONIC ACID 0.04 MG/ML
4 INJECTION, SOLUTION INTRAVENOUS ONCE
Status: COMPLETED | OUTPATIENT
Start: 2024-09-17 | End: 2024-09-17

## 2024-09-17 RX ORDER — ALBUTEROL SULFATE 0.83 MG/ML
3 SOLUTION RESPIRATORY (INHALATION) AS NEEDED
OUTPATIENT
Start: 2025-03-04

## 2024-09-17 RX ORDER — DIPHENHYDRAMINE HYDROCHLORIDE 50 MG/ML
50 INJECTION INTRAMUSCULAR; INTRAVENOUS AS NEEDED
OUTPATIENT
Start: 2025-03-04

## 2024-09-17 RX ORDER — ALBUTEROL SULFATE 0.83 MG/ML
3 SOLUTION RESPIRATORY (INHALATION) AS NEEDED
Status: DISCONTINUED | OUTPATIENT
Start: 2024-09-17 | End: 2024-09-17 | Stop reason: HOSPADM

## 2024-09-17 RX ORDER — FAMOTIDINE 10 MG/ML
20 INJECTION INTRAVENOUS ONCE AS NEEDED
Status: DISCONTINUED | OUTPATIENT
Start: 2024-09-17 | End: 2024-09-17 | Stop reason: HOSPADM

## 2024-09-17 RX ORDER — EPINEPHRINE 0.3 MG/.3ML
0.3 INJECTION SUBCUTANEOUS EVERY 5 MIN PRN
Status: DISCONTINUED | OUTPATIENT
Start: 2024-09-17 | End: 2024-09-17 | Stop reason: HOSPADM

## 2024-09-17 RX ORDER — EPINEPHRINE 0.3 MG/.3ML
0.3 INJECTION SUBCUTANEOUS EVERY 5 MIN PRN
OUTPATIENT
Start: 2025-03-04

## 2024-09-17 ASSESSMENT — PAIN SCALES - GENERAL: PAINLEVEL: 0-NO PAIN

## 2024-09-17 NOTE — PROGRESS NOTES
Pt arrived ambulatory for first dose zometa infusion. Pt denied any new or worsening symptoms. PIV established with brisk blood return and labs sent per order. Pt educated on zometa. Hand outs given to patient. Pt verbalized understanding of drug and its side effects.   Pt tolerated infusion well. PIV removed and bandaged. Pt instructed to stop at the scheduling desk prior to leaving. Pt left infusion ambulatory.

## 2024-12-12 ENCOUNTER — TELEPHONE (OUTPATIENT)
Dept: HEMATOLOGY/ONCOLOGY | Facility: CLINIC | Age: 64
End: 2024-12-12
Payer: COMMERCIAL

## 2024-12-12 DIAGNOSIS — C50.412 INFILTRATING DUCTAL CARCINOMA OF UPPER-OUTER QUADRANT OF LEFT BREAST IN FEMALE: Primary | ICD-10-CM

## 2024-12-12 RX ORDER — ANASTROZOLE 1 MG/1
1 TABLET ORAL DAILY
Qty: 30 TABLET | Refills: 11 | Status: SHIPPED | OUTPATIENT
Start: 2024-12-12 | End: 2025-12-12

## 2024-12-12 NOTE — TELEPHONE ENCOUNTER
Pt requesting refill   Anastrozole 1mg. 1 tablet daily  Pt only has 3 pills left.   Pt has NPV with Dr. Samuel (previous pt of Dr Mon) on 2/11  Pharmacy: [facility] on [facility] [facility] in [place].

## 2025-01-13 ENCOUNTER — HOSPITAL ENCOUNTER (OUTPATIENT)
Dept: RADIOLOGY | Facility: HOSPITAL | Age: 65
Discharge: HOME | End: 2025-01-13
Payer: MEDICARE

## 2025-01-13 VITALS — WEIGHT: 191 LBS | HEIGHT: 60 IN | BODY MASS INDEX: 37.5 KG/M2

## 2025-01-13 DIAGNOSIS — Z17.0 MALIGNANT NEOPLASM OF UPPER-OUTER QUADRANT OF LEFT BREAST IN FEMALE, ESTROGEN RECEPTOR POSITIVE: ICD-10-CM

## 2025-01-13 DIAGNOSIS — C50.412 MALIGNANT NEOPLASM OF UPPER-OUTER QUADRANT OF LEFT BREAST IN FEMALE, ESTROGEN RECEPTOR POSITIVE: ICD-10-CM

## 2025-01-13 DIAGNOSIS — Z17.0 MALIGNANT NEOPLASM OF LEFT BREAST IN FEMALE, ESTROGEN RECEPTOR POSITIVE, UNSPECIFIED SITE OF BREAST: Primary | ICD-10-CM

## 2025-01-13 DIAGNOSIS — C50.912 MALIGNANT NEOPLASM OF LEFT BREAST IN FEMALE, ESTROGEN RECEPTOR POSITIVE, UNSPECIFIED SITE OF BREAST: Primary | ICD-10-CM

## 2025-01-13 PROCEDURE — 77062 BREAST TOMOSYNTHESIS BI: CPT

## 2025-01-13 PROCEDURE — 77062 BREAST TOMOSYNTHESIS BI: CPT | Performed by: RADIOLOGY

## 2025-01-13 PROCEDURE — 77066 DX MAMMO INCL CAD BI: CPT | Performed by: RADIOLOGY

## 2025-02-11 ENCOUNTER — OFFICE VISIT (OUTPATIENT)
Dept: HEMATOLOGY/ONCOLOGY | Facility: CLINIC | Age: 65
End: 2025-02-11
Payer: MEDICARE

## 2025-02-11 VITALS
SYSTOLIC BLOOD PRESSURE: 132 MMHG | TEMPERATURE: 97.7 F | WEIGHT: 197.31 LBS | BODY MASS INDEX: 38.53 KG/M2 | HEART RATE: 79 BPM | DIASTOLIC BLOOD PRESSURE: 69 MMHG | OXYGEN SATURATION: 94 %

## 2025-02-11 DIAGNOSIS — C50.412 INFILTRATING DUCTAL CARCINOMA OF UPPER-OUTER QUADRANT OF LEFT BREAST IN FEMALE: ICD-10-CM

## 2025-02-11 PROCEDURE — 99215 OFFICE O/P EST HI 40 MIN: CPT | Performed by: STUDENT IN AN ORGANIZED HEALTH CARE EDUCATION/TRAINING PROGRAM

## 2025-02-11 PROCEDURE — 1123F ACP DISCUSS/DSCN MKR DOCD: CPT | Performed by: STUDENT IN AN ORGANIZED HEALTH CARE EDUCATION/TRAINING PROGRAM

## 2025-02-11 PROCEDURE — 1159F MED LIST DOCD IN RCRD: CPT | Performed by: STUDENT IN AN ORGANIZED HEALTH CARE EDUCATION/TRAINING PROGRAM

## 2025-02-11 PROCEDURE — 1126F AMNT PAIN NOTED NONE PRSNT: CPT | Performed by: STUDENT IN AN ORGANIZED HEALTH CARE EDUCATION/TRAINING PROGRAM

## 2025-02-11 ASSESSMENT — PAIN SCALES - GENERAL: PAINLEVEL_OUTOF10: 0-NO PAIN

## 2025-02-11 NOTE — PATIENT INSTRUCTIONS
Continue Zometa Infusions every 6 months.  Continue taking Anastrozole daily.  Follow up with Priscilla Ramirez PA-C in [place] Office.  Please call [phone] with questions or concerns.

## 2025-02-11 NOTE — PROGRESS NOTES
Breast Medical Oncology Clinic  Location: [facility]    Visit Type: New Patient Visit/Transition care    Oncologic History:    - 1/12/24: Bilateral screening mammogram showed a spiculated mass in the left breast.  - 1/15/24: Diagnostic ultrasound confirmed a 15.9 mm hypoechoic solid mass at 2:00, 12 cm FN, within the left breat. On 2/5/24, axillary LN's appeared normal on repeat US.  - 2/5/24: US-guided CNB showed IDC, grade 2, ER >95% AK 75% Her2-negative (1+).  - 3/27/24: Left lumpectomy and SLNBx performed. 3.0 cm of grade 2 IDC removed, no LVI, margins negative. Of 3 LN's removed, 1 contained ITC's. No TITO. Oncotype Dx RS 20 (intermediate risk), no chemotherapy benefit.  - Adjuvant RT completed 6/24/24 (Dr. Krueger).  - Started adjuvant anastrozole July 2024    Subjective: History of Present Illness    Patient presents today for follow-up new to doctor visit.    Oncologic history reviewed above with patient.     Denies interval events since last follow-up- no recent hospitalizations, new medical diagnoses, or new medications.     She continues on anastrozole and is tolerating well.     Denies weight loss, changes in the breast and/or chest wall, new aches or pains, changes in appetite or energy level. No current concerns at this time.     Social History  Nidia Smith  reports that she has never smoked. She has never been exposed to tobacco smoke. She has never used smokeless tobacco.  She  reports that she does not currently use alcohol.  She  reports no history of drug use.    ROS:     Review of Systems   All other systems reviewed and are negative.       Physical Examination:    /69 (BP Location: Right arm, Patient Position: Sitting, BP Cuff Size: Large adult)   Pulse 79   Temp 36.5 °C (97.7 °F) (Temporal)   Wt 89.5 kg (197 lb 5 oz)   SpO2 94%   BMI 38.53 kg/m²     Physical Exam  Vitals and nursing note reviewed.   Constitutional:       General: She is not in acute distress.     Appearance: Normal  appearance. She is not toxic-appearing.   HENT:      Head: Normocephalic and atraumatic.   Eyes:      Conjunctiva/sclera: Conjunctivae normal.   Cardiovascular:      Rate and Rhythm: Normal rate.   Pulmonary:      Effort: Pulmonary effort is normal. No respiratory distress.   Abdominal:      General: Abdomen is flat.      Palpations: Abdomen is soft.   Musculoskeletal:         General: No swelling. Normal range of motion.      Cervical back: Normal range of motion.   Skin:     General: Skin is warm and dry.   Neurological:      Mental Status: She is alert.   Psychiatric:         Mood and Affect: Mood normal.         Breast Examination:    Left breast: No masses, skin or nipple changes  Right breast: No masses, skin or nipple changes  Axillary: No significant examination findings    ECOG Performance Status:     [] 0 Fully active, able to carry on all pre-disease performance without restriction  [] 1 Restricted in physically strenuous activity but ambulatory and able to carry out work of a light or sedentary nature, e.g., light house work, office work  [] 2 Ambulatory and capable of all selfcare but unable to carry out any work activities; up and about more than 50% of waking hours  [] 3 Capable of only limited selfcare; confined to bed or chair more than 50% of waking hours  [] 4 Completely disabled; cannot carry on any selfcare; totally confined to bed or chair  [] 5 Dead     Results:    Labs:  No new pertinent results since last visit    Lab Results   Component Value Date    WBC 10.2 03/20/2024    HGB 12.9 03/20/2024    HCT 40.1 03/20/2024    MCV 98 03/20/2024     03/20/2024       Chemistry    Lab Results   Component Value Date/Time     09/17/2024 1420    K 3.4 (L) 09/17/2024 1420     09/17/2024 1420    CO2 27 09/17/2024 1420    BUN 13 09/17/2024 1420    CREATININE 0.90 09/17/2024 1420    Lab Results   Component Value Date/Time    CALCIUM 9.4 09/17/2024 1420    ALKPHOS 57 09/17/2024 1420    AST  23 09/17/2024 1420    ALT 20 09/17/2024 1420    BILITOT 0.8 09/17/2024 1420             Imaging:  Reviewed above in Onc History    Pathology:  Reviewed above in Onc History    Assessment:     Pathologic prognostic stage IA (pT2 pN0(I+) cM0) infiltrating ductal carcinoma of the left breast; Dx in 2/2024; Grade 2; ER positive (>95%), CT positive (75%), HER2 negative; Oncotype DX 20 ; S/p L PM/SLNBx; S/p radiation; On anastrozole since 7/2024    Presents to establish care. Doing well. No evidence of breast cancer recurrence per patient history, physical examination and imaging findings.       Breast Cancer Treatment Plan:    Surgical Plan: S/p L PM with SNLBx  Additional biopsy: No further biopsy indicated  Radiation Plan: Completed  Additional staging scans/DEXA/echo: Staging scans not indicated based on current stage, patient history and physical examination. DEXA 1/2024  Additional Path info (i.e Ki67, PDL1): Not indicated  Gene assays: Oncotype DX 10    Systemic treatment plan: Anastrozole     Intent: Curative   Clinical trial: Not eligible for our current trials   Endocrine therapy: Anastrozole   HER2 treatment: not indicated   Targeted agents: not indicated   Chemotherapy: Not indicated   BMA: Receiving Zoledronic Acid every 6 months    Access: Not indicated  Supportive meds: No new supportive medications prescribed  Genetic testing: Not indicated  Fertility preservation: Not indicated    Other active problems/orders:     Osteopenia: On zometa. DEXA due 1/2026  We spent a portion of our encounter discussing lifestyle modifications that may help with cancer outcomes and overall wellbeing. We discussed regular exercise aiming for 30 minutes 5 times a week. We discussed diet modifications such as limiting red meat and processed foods. We also discussed limiting alcohol intake.  Postsurgical architectural distortion with surgical clips are note within the upper quadrant of the left breast posterior depth on recent  mammogram: 6 month follow-up ordered by Dr. Sanchez (7/2025)    Surveillance plan: Continue yearly mammogram; 6 month follow-up mammogram of L in 7/2025, ordered by Dr. Sanchez    Follow-up: 6 months with Priscilla Ramirez    Nidia Smith expressed understanding of the plan outlined above. Nidia Smith had ample time to ask questions. Nidia Smith understands she can contact us at any time should she have additional questions or issues arise in the interim.    Dean Samuel MD  Breast Medical Oncology  [facility]    Portions of this note were dictated utilizing voice recognition software.

## 2025-02-11 NOTE — PROGRESS NOTES
BREAST SURGICAL ONCOLOGY FOLLOW UP     Subjective   Nidia Smith is a 64 y.o. female presents today for follow up carcinoma of the left breast.   She is doing well and has no complaints or concerns    Treatment history:  - 1/12/24: Bilateral screening mammogram showed a spiculated mass in the left breast.  - 1/15/24: Diagnostic ultrasound confirmed a 15.9 mm hypoechoic solid mass at 2:00, 12 cm FN, within the left breat. On 2/5/24, axillary LN's appeared normal on repeat US.  - 2/5/24: US-guided CNB showed IDC, grade 2, ER >95% ND 75% Her2-negative (1+).  - 3/27/24: Left lumpectomy and SLNBx performed. 3.0 cm of grade 2 IDC removed, no LVI, margins negative. Of 3 LN's removed, 1 contained ITC's. No TITO. Oncotype Dx RS 20 (intermediate risk), no chemotherapy benefit.  - Adjuvant RT completed 6/24/24 (Dr. Krueger).  - Started adjuvant anastrozole July 2024       Surgery: 3/27/2024: Left lumpectomy and sentinel lymph node biopsy  Radiation: 5/28/2024 - 6/24/2024: Completed radiation (Dr. Krueger); 4256 Gy with 1000 Gy tumor bed boost  Systemic therapy: 7/2024: Anastrozole started      Mammogram: 1/13/2025-bilateral mammogram shows new posttreatment changes in the left breast.  No evidence of malignancy; category 3  Radiology review: All images and reports were personally reviewed and the findings discussed with the patient.        Cancer Staging   Infiltrating ductal carcinoma of upper-outer quadrant of left breast in female  Staging form: Breast, AJCC 8th Edition  - Clinical stage from 2/5/2024: Stage IA - Signed by Barb Sanchez MD on 2/11/2025  cT1  cN0  cM0  Karan grade: G2  ER Status: Positive  ND Status: Positive  HER2 Status: Negative  - Pathologic stage from 3/27/2024: Stage IA - Signed by Barb Sanchez MD on 2/11/2025  pT2  pN0(i+)  cM0  [place] grade: G2  ER Status: Positive  ND Status: Positive  HER2 Status: Negative        Review of Systems   Constitutional symptoms: Denies generalized fatigue.  Denies  weight change, fevers/chills, difficulty sleeping   Eyes: Denies double vision, glaucoma, cataracts.  Ear/nose/throat/mouth: Denies hearing changes, sore throat, sinus problems.  Cardiovascular: No chest pain.  Denies irregular heartbeat.  Denies ankle swelling.  Respiratory: No wheezing, cough, or shortness of breath.  Gastrointestinal: No abdominal pain,  No nausea/vomiting.  No indigestion/heartburn.  No change in bowel habits.  No constipation or diarrhea.   Genitourinary: No urinary incontinence.  No urinary frequency.  No painful urination.  Musculoskeletal: No bone pain, no muscle pain, no joint pain.   Integumentary: No rash. No masses.  No changes in moles.  No easy bruising.  Neurological: No headaches.  No tremors. No numbness/tingling.  Psychiatric: No anxiety. No depression.  Endocrine: No excessive thirst.  Not too hot or too cold.  Not tired or fatigued.    Hematological/lymphatic: No swollen glands or blood clotting problems.  No bruising.    PHYSICAL EXAM:    General: Alert and oriented x 3.  Mood and affect are appropriate.  Neck: supple, no masses, no cervical adenopathy.  Cardiovascular: no lower extremity edema.  Pulmonary: breathing non labored on room air.  GI: Abdomen soft, no masses. No hepatomegaly or splenomegaly.  Lymph nodes: No supraclavicular or axillary adenopathy bilaterally.  Musculoskeletal: Full range of motion in the upper extremities bilaterally.  Neuro: denies dizziness, tremors  Physical Exam  Chest:          Comments: There is no cervical, supraclavicular, or axillary lymphadenopathy.  Breasts are symmetric bilaterally. In the right breast, there are no skin changes, no dominant masses, and no nipple discharge. In the left breast, there are no palpable masses.  There is some central edema and skin thickening.           Assessment/Plan     stage IA left breast cancer diagnosed 2/5/2024  -pT2N0(i+)M0              Invasive ductal carcinoma, grade 2 ; ER>95%, ND 75%, Her 2 neg               ER>95%, [place] 75%, Her2 negative       Clinical breast exam and mammogram today are normal.  There is no evidence of cancer recurrence.    Continue follow up with medical oncology as scheduled.  Continue anastrozole.    Will follow up with me in July 2025 at the time of left mammogram or sooner if there are any breast concerns.     Barb Sanchez MD

## 2025-02-18 ENCOUNTER — OFFICE VISIT (OUTPATIENT)
Dept: SURGICAL ONCOLOGY | Facility: CLINIC | Age: 65
End: 2025-02-18
Payer: MEDICARE

## 2025-02-18 VITALS
RESPIRATION RATE: 16 BRPM | TEMPERATURE: 97.5 F | DIASTOLIC BLOOD PRESSURE: 82 MMHG | BODY MASS INDEX: 37.69 KG/M2 | WEIGHT: 199.6 LBS | HEART RATE: 84 BPM | SYSTOLIC BLOOD PRESSURE: 144 MMHG | HEIGHT: 61 IN

## 2025-02-18 DIAGNOSIS — C50.412 INFILTRATING DUCTAL CARCINOMA OF UPPER-OUTER QUADRANT OF LEFT BREAST IN FEMALE: Primary | ICD-10-CM

## 2025-02-18 PROCEDURE — 1123F ACP DISCUSS/DSCN MKR DOCD: CPT | Performed by: SURGERY

## 2025-02-18 PROCEDURE — 99213 OFFICE O/P EST LOW 20 MIN: CPT | Performed by: SURGERY

## 2025-02-18 PROCEDURE — 1126F AMNT PAIN NOTED NONE PRSNT: CPT | Performed by: SURGERY

## 2025-02-18 PROCEDURE — 1159F MED LIST DOCD IN RCRD: CPT | Performed by: SURGERY

## 2025-02-18 PROCEDURE — 1160F RVW MEDS BY RX/DR IN RCRD: CPT | Performed by: SURGERY

## 2025-02-18 PROCEDURE — 3008F BODY MASS INDEX DOCD: CPT | Performed by: SURGERY

## 2025-02-18 ASSESSMENT — PAIN SCALES - GENERAL: PAINLEVEL_OUTOF10: 0-NO PAIN

## 2025-02-19 ENCOUNTER — APPOINTMENT (OUTPATIENT)
Dept: HEMATOLOGY/ONCOLOGY | Facility: CLINIC | Age: 65
End: 2025-02-19
Payer: COMMERCIAL

## 2025-02-21 LAB
ALBUMIN SERPL-MCNC: 4.4 G/DL (ref 3.6–5.1)
ALP SERPL-CCNC: 57 U/L (ref 37–153)
ALT SERPL-CCNC: 20 U/L (ref 6–29)
ANION GAP SERPL CALCULATED.4IONS-SCNC: 10 MMOL/L (CALC) (ref 7–17)
APPEARANCE UR: ABNORMAL
AST SERPL-CCNC: 18 U/L (ref 10–35)
BACTERIA #/AREA URNS HPF: ABNORMAL /HPF
BASOPHILS # BLD AUTO: 62 CELLS/UL (ref 0–200)
BASOPHILS NFR BLD AUTO: 0.8 %
BILIRUB SERPL-MCNC: 1 MG/DL (ref 0.2–1.2)
BILIRUB UR QL STRIP: NEGATIVE
BUN SERPL-MCNC: 15 MG/DL (ref 7–25)
CALCIUM SERPL-MCNC: 9.2 MG/DL (ref 8.6–10.4)
CHLORIDE SERPL-SCNC: 104 MMOL/L (ref 98–110)
CHOLEST SERPL-MCNC: 221 MG/DL
CHOLEST/HDLC SERPL: 3.3 (CALC)
CO2 SERPL-SCNC: 28 MMOL/L (ref 20–32)
COLOR UR: YELLOW
CREAT SERPL-MCNC: 0.84 MG/DL (ref 0.5–1.05)
EGFRCR SERPLBLD CKD-EPI 2021: 77 ML/MIN/1.73M2
EOSINOPHIL # BLD AUTO: 177 CELLS/UL (ref 15–500)
EOSINOPHIL NFR BLD AUTO: 2.3 %
ERYTHROCYTE [DISTWIDTH] IN BLOOD BY AUTOMATED COUNT: 13.1 % (ref 11–15)
GLUCOSE SERPL-MCNC: 91 MG/DL (ref 65–99)
GLUCOSE UR QL STRIP: NEGATIVE
HCT VFR BLD AUTO: 41.3 % (ref 35–45)
HDLC SERPL-MCNC: 66 MG/DL
HGB BLD-MCNC: 13.5 G/DL (ref 11.7–15.5)
HGB UR QL STRIP: NEGATIVE
HYALINE CASTS #/AREA URNS LPF: ABNORMAL /LPF
KETONES UR QL STRIP: NEGATIVE
LDLC SERPL CALC-MCNC: 132 MG/DL (CALC)
LEUKOCYTE ESTERASE UR QL STRIP: ABNORMAL
LYMPHOCYTES # BLD AUTO: 1717 CELLS/UL (ref 850–3900)
LYMPHOCYTES NFR BLD AUTO: 22.3 %
MCH RBC QN AUTO: 32.1 PG (ref 27–33)
MCHC RBC AUTO-ENTMCNC: 32.7 G/DL (ref 32–36)
MCV RBC AUTO: 98.3 FL (ref 80–100)
MONOCYTES # BLD AUTO: 693 CELLS/UL (ref 200–950)
MONOCYTES NFR BLD AUTO: 9 %
NEUTROPHILS # BLD AUTO: 5051 CELLS/UL (ref 1500–7800)
NEUTROPHILS NFR BLD AUTO: 65.6 %
NITRITE UR QL STRIP: NEGATIVE
NONHDLC SERPL-MCNC: 155 MG/DL (CALC)
PH UR STRIP: 7.5 [PH] (ref 5–8)
PLATELET # BLD AUTO: 298 THOUSAND/UL (ref 140–400)
PMV BLD REES-ECKER: 11 FL (ref 7.5–12.5)
POTASSIUM SERPL-SCNC: 4.2 MMOL/L (ref 3.5–5.3)
PROT SERPL-MCNC: 6.7 G/DL (ref 6.1–8.1)
PROT UR QL STRIP: NEGATIVE
RBC # BLD AUTO: 4.2 MILLION/UL (ref 3.8–5.1)
RBC #/AREA URNS HPF: ABNORMAL /HPF
SERVICE CMNT-IMP: ABNORMAL
SODIUM SERPL-SCNC: 142 MMOL/L (ref 135–146)
SP GR UR STRIP: 1.01 (ref 1–1.03)
SQUAMOUS #/AREA URNS HPF: ABNORMAL /HPF
T4 FREE SERPL-MCNC: 1.9 NG/DL (ref 0.8–1.8)
TRIGL SERPL-MCNC: 123 MG/DL
TSH SERPL-ACNC: 13.22 MIU/L (ref 0.4–4.5)
WBC # BLD AUTO: 7.7 THOUSAND/UL (ref 3.8–10.8)
WBC #/AREA URNS HPF: ABNORMAL /HPF

## 2025-02-27 ENCOUNTER — APPOINTMENT (OUTPATIENT)
Dept: PRIMARY CARE | Facility: CLINIC | Age: 65
End: 2025-02-27
Payer: COMMERCIAL

## 2025-02-27 ENCOUNTER — TELEPHONE (OUTPATIENT)
Dept: PRIMARY CARE | Facility: CLINIC | Age: 65
End: 2025-02-27

## 2025-02-27 VITALS
HEIGHT: 61 IN | DIASTOLIC BLOOD PRESSURE: 84 MMHG | RESPIRATION RATE: 18 BRPM | BODY MASS INDEX: 36.63 KG/M2 | OXYGEN SATURATION: 97 % | SYSTOLIC BLOOD PRESSURE: 120 MMHG | HEART RATE: 89 BPM | TEMPERATURE: 97.9 F | WEIGHT: 194 LBS

## 2025-02-27 DIAGNOSIS — Z00.00 WELL ADULT EXAM: ICD-10-CM

## 2025-02-27 DIAGNOSIS — C50.412 INFILTRATING DUCTAL CARCINOMA OF UPPER-OUTER QUADRANT OF LEFT BREAST IN FEMALE: ICD-10-CM

## 2025-02-27 DIAGNOSIS — F41.8 DEPRESSION WITH ANXIETY: Primary | ICD-10-CM

## 2025-02-27 DIAGNOSIS — E03.8 OTHER SPECIFIED HYPOTHYROIDISM: ICD-10-CM

## 2025-02-27 DIAGNOSIS — Z91.89 AT HIGH RISK FOR OSTEOPOROSIS: Primary | ICD-10-CM

## 2025-02-27 DIAGNOSIS — E78.00 HYPERCHOLESTEROLEMIA: ICD-10-CM

## 2025-02-27 PROBLEM — E66.09 OBESITY DUE TO EXCESS CALORIES: Status: ACTIVE | Noted: 2025-02-27

## 2025-02-27 PROCEDURE — 1126F AMNT PAIN NOTED NONE PRSNT: CPT | Performed by: FAMILY MEDICINE

## 2025-02-27 PROCEDURE — 1036F TOBACCO NON-USER: CPT | Performed by: FAMILY MEDICINE

## 2025-02-27 PROCEDURE — 3008F BODY MASS INDEX DOCD: CPT | Performed by: FAMILY MEDICINE

## 2025-02-27 PROCEDURE — 1123F ACP DISCUSS/DSCN MKR DOCD: CPT | Performed by: FAMILY MEDICINE

## 2025-02-27 PROCEDURE — G0438 PPPS, INITIAL VISIT: HCPCS | Performed by: FAMILY MEDICINE

## 2025-02-27 PROCEDURE — 99212 OFFICE O/P EST SF 10 MIN: CPT | Performed by: FAMILY MEDICINE

## 2025-02-27 PROCEDURE — 1170F FXNL STATUS ASSESSED: CPT | Performed by: FAMILY MEDICINE

## 2025-02-27 PROCEDURE — 1159F MED LIST DOCD IN RCRD: CPT | Performed by: FAMILY MEDICINE

## 2025-02-27 ASSESSMENT — PAIN SCALES - GENERAL: PAINLEVEL_OUTOF10: 0-NO PAIN

## 2025-02-27 ASSESSMENT — ACTIVITIES OF DAILY LIVING (ADL)
MANAGING_FINANCES: INDEPENDENT
TAKING_MEDICATION: INDEPENDENT
DRESSING: INDEPENDENT
BATHING: INDEPENDENT
DOING_HOUSEWORK: INDEPENDENT
GROCERY_SHOPPING: INDEPENDENT

## 2025-02-27 ASSESSMENT — ENCOUNTER SYMPTOMS
OCCASIONAL FEELINGS OF UNSTEADINESS: 0
DEPRESSION: 0
LOSS OF SENSATION IN FEET: 0

## 2025-02-27 NOTE — ASSESSMENT & PLAN NOTE
Anticipatory guidance given.  She wants to be immunized against shingles but she thinks she has never had chickenpox.  Will get a varicella titer and notify her of results and act accordingly.    Orders:    Varicella zoster antibody, IgG; Future

## 2025-02-27 NOTE — PROGRESS NOTES
"Subjective   Reason for Visit: Libby Smith is an 65 y.o. female here for a Medicare Wellness visit.     Past Medical, Surgical, and Family History reviewed and updated in chart.    Reviewed all medications by prescribing practitioner or clinical pharmacist (such as prescriptions, OTCs, herbal therapies and supplements) and documented in the medical record.    HPI    Patient Care Team:  Jason Irby MD as PCP - General (Family Medicine)  Jason Irby MD as Primary Care Provider  Jason Franks MD as Consulting Physician (Hematology and Oncology)  Kathleen Mon MD as Consulting Physician (Hematology and Oncology)  Hao Gant MD as Medical Oncologist (Hematology and Oncology)  Dean Samuel MD as Consulting Physician (Hematology and Oncology)   Colonoscopy in 9/22 by Dr. Collier showed diverticulosis.  She is to repeat in 10 years.  GYN history -. Last Pap was in 2021 and was normal . HPV was negative. Last period was in 2013.    1. LIBBY is seen for follow up of Hypothyroidism.  She is on Synthroid (name brand) 150 mcg.  Recent TSH is elevated with a minimally elevated  thyroxine of 1.9.     2. LIBBY is seen for also for for breast CA.   She underwent lumpectomy by Dr. Sanchez in 4/24.  This was followed by radiation treatments.She is followed by oncology and Dr. Sanchez.     3. LIBBY is seen today also for follow up of Hypercholesterolemia.  She is on atorvastatin 40 (started 9/23). Recent LDL is 132 .     4. LIBBY is seen today also for follow up of depression.  She is doing well on Zoloft 100mg.     Review of Systems  Denies headache, blurred vision, chest pain, shortness of breath, nausea or vomiting, change in bowel habits or leg pain or swelling.      Objective   Vitals:  /84 (BP Location: Left arm, Patient Position: Sitting, BP Cuff Size: Large adult)   Pulse 89   Temp 36.6 °C (97.9 °F) (Temporal)   Resp 18   Ht 1.549 m (5' 1\")   Wt 88 kg (194 lb)   SpO2 97%   BMI 36.66 kg/m²   "     Physical Exam  General appearance: Comfortable.  She is well-nourished, well-developed.  She is awake, alert, and oriented and appears her stated age.The patient is cooperative with exam.  Head: Hair pattern reveals a normal pattern for patient's age and face shows no abnormalities.  eyes: PERRLA, EOMI x2, conjunctival and sclera are clear.   Nose: No polyps, ulcerations, or lesions.  Throat: Oral mucosa reveals no abnormalities and teeth are in good repair.  Neck:  Supple without lymphadenopathy.  No thyromegaly or carotid bruits.  Lungs: Clear to auscultation bilaterally with no wheezes, rales or rhonchi.  Chest Wall: No abnormalities  Breast: Deferred to surgeon  Cardio:Regular rate and rhythm with no murmurs.  No friction rubs.  No carotid bruits.  Abdomen: Abdomen is soft, nontender, no masses and no hepatosplenomegaly.  Genitourinary: Labia reveal no lesions.  Vaginal mucosa reveals no abnormalities.  Cervix reveals no abnormalities.  Negative chandelier sign.  Uterus is normal in size, shape and position.Bilateral adnexa reveals no masses or tenderness.  Lymph nodes: Bilateral axillary lymph nodes and inguinal nodes are unremarkable.  Musculoskeletal: Bilateral upper and lower extremities are equal in strength at 5/5.  Skin: Good turgor and no rashes.  Neurological: Intact and nonfocal.  Cranial nerves II through XII are grossly intact.  Psychiatric: Patient has appropriate judgment.  Patient has good insight.  Patient's mood is appropriate.    Assessment & Plan  Depression with anxiety  Continue sertraline.  Recheck in 6 months.         Hypercholesterolemia  Continue atorvastatin.  Improve diet and increase exercise.  Recheck in 6 months.         Other specified hypothyroidism  Will keep current dose of Synthroid.  Recheck in 6 months.         Well adult exam  Anticipatory guidance given.  She wants to be immunized against shingles but she thinks she has never had chickenpox.  Will get a varicella titer  and notify her of results and act accordingly.    Orders:    Varicella zoster antibody, IgG; Future    Infiltrating ductal carcinoma of upper-outer quadrant of left breast in female  Continue following with surgeon and oncologist.

## 2025-03-02 DIAGNOSIS — E78.00 HYPERCHOLESTEROLEMIA: ICD-10-CM

## 2025-03-03 ENCOUNTER — INFUSION (OUTPATIENT)
Dept: HEMATOLOGY/ONCOLOGY | Facility: CLINIC | Age: 65
End: 2025-03-03
Payer: MEDICARE

## 2025-03-03 VITALS
DIASTOLIC BLOOD PRESSURE: 64 MMHG | SYSTOLIC BLOOD PRESSURE: 135 MMHG | RESPIRATION RATE: 16 BRPM | TEMPERATURE: 97 F | HEART RATE: 78 BPM | BODY MASS INDEX: 36.91 KG/M2 | OXYGEN SATURATION: 95 % | WEIGHT: 195.33 LBS

## 2025-03-03 DIAGNOSIS — C50.412 INFILTRATING DUCTAL CARCINOMA OF UPPER-OUTER QUADRANT OF LEFT BREAST IN FEMALE: ICD-10-CM

## 2025-03-03 DIAGNOSIS — Z91.89 AT HIGH RISK FOR OSTEOPOROSIS: ICD-10-CM

## 2025-03-03 PROCEDURE — 96365 THER/PROPH/DIAG IV INF INIT: CPT | Mod: INF

## 2025-03-03 PROCEDURE — 2500000004 HC RX 250 GENERAL PHARMACY W/ HCPCS (ALT 636 FOR OP/ED)

## 2025-03-03 RX ORDER — ATORVASTATIN CALCIUM 40 MG/1
40 TABLET, FILM COATED ORAL DAILY
Qty: 90 TABLET | Refills: 2 | Status: SHIPPED | OUTPATIENT
Start: 2025-03-03

## 2025-03-03 RX ORDER — EPINEPHRINE 0.3 MG/.3ML
0.3 INJECTION SUBCUTANEOUS EVERY 5 MIN PRN
OUTPATIENT
Start: 2025-08-18

## 2025-03-03 RX ORDER — DIPHENHYDRAMINE HYDROCHLORIDE 50 MG/ML
50 INJECTION INTRAMUSCULAR; INTRAVENOUS AS NEEDED
OUTPATIENT
Start: 2025-08-18

## 2025-03-03 RX ORDER — ZOLEDRONIC ACID 0.04 MG/ML
4 INJECTION, SOLUTION INTRAVENOUS ONCE
Status: COMPLETED | OUTPATIENT
Start: 2025-03-03 | End: 2025-03-03

## 2025-03-03 RX ORDER — ALBUTEROL SULFATE 0.83 MG/ML
3 SOLUTION RESPIRATORY (INHALATION) AS NEEDED
OUTPATIENT
Start: 2025-08-18

## 2025-03-03 RX ORDER — FAMOTIDINE 10 MG/ML
20 INJECTION, SOLUTION INTRAVENOUS ONCE AS NEEDED
OUTPATIENT
Start: 2025-08-18

## 2025-03-03 RX ORDER — ZOLEDRONIC ACID 0.04 MG/ML
4 INJECTION, SOLUTION INTRAVENOUS ONCE
OUTPATIENT
Start: 2025-08-18

## 2025-03-03 RX ADMIN — ZOLEDRONIC ACID 4 MG: 0.04 INJECTION, SOLUTION INTRAVENOUS at 08:43

## 2025-03-03 ASSESSMENT — PAIN SCALES - GENERAL: PAINLEVEL_OUTOF10: 0-NO PAIN

## 2025-03-04 DIAGNOSIS — F41.8 DEPRESSION WITH ANXIETY: ICD-10-CM

## 2025-03-04 RX ORDER — SERTRALINE HYDROCHLORIDE 100 MG/1
TABLET, FILM COATED ORAL
Qty: 90 TABLET | Refills: 1 | Status: SHIPPED | OUTPATIENT
Start: 2025-03-04

## 2025-05-28 DIAGNOSIS — E03.8 OTHER SPECIFIED HYPOTHYROIDISM: ICD-10-CM

## 2025-05-28 RX ORDER — LEVOTHYROXINE SODIUM 100 UG/1
100 TABLET ORAL DAILY
Qty: 90 TABLET | Refills: 3 | Status: SHIPPED | OUTPATIENT
Start: 2025-05-28 | End: 2026-05-28

## 2025-07-14 ENCOUNTER — HOSPITAL ENCOUNTER (OUTPATIENT)
Dept: RADIOLOGY | Facility: HOSPITAL | Age: 65
Discharge: HOME | End: 2025-07-14
Payer: MEDICARE

## 2025-07-14 VITALS — WEIGHT: 195 LBS | HEIGHT: 61 IN | BODY MASS INDEX: 36.82 KG/M2

## 2025-07-14 DIAGNOSIS — Z17.0 MALIGNANT NEOPLASM OF LEFT BREAST IN FEMALE, ESTROGEN RECEPTOR POSITIVE, UNSPECIFIED SITE OF BREAST: ICD-10-CM

## 2025-07-14 DIAGNOSIS — C50.912 MALIGNANT NEOPLASM OF LEFT BREAST IN FEMALE, ESTROGEN RECEPTOR POSITIVE, UNSPECIFIED SITE OF BREAST: ICD-10-CM

## 2025-07-14 PROCEDURE — G0279 TOMOSYNTHESIS, MAMMO: HCPCS | Mod: LEFT SIDE | Performed by: STUDENT IN AN ORGANIZED HEALTH CARE EDUCATION/TRAINING PROGRAM

## 2025-07-14 PROCEDURE — 77065 DX MAMMO INCL CAD UNI: CPT | Mod: LT

## 2025-07-14 PROCEDURE — 77065 DX MAMMO INCL CAD UNI: CPT | Mod: LEFT SIDE | Performed by: STUDENT IN AN ORGANIZED HEALTH CARE EDUCATION/TRAINING PROGRAM

## 2025-07-17 NOTE — PROGRESS NOTES
BREAST SURGICAL ONCOLOGY FOLLOW UP     Subjective   Nidia Smith is a 65 y.o. female presents today for follow up carcinoma of the left breast.   She is doing well and has no complaints or concerns    Treatment history:  - 1/12/24: Bilateral screening mammogram showed a spiculated mass in the left breast.  - 1/15/24: Diagnostic ultrasound confirmed a 15.9 mm hypoechoic solid mass at 2:00, 12 cm FN, within the left breat. On 2/5/24, axillary LN's appeared normal on repeat US.  - 2/5/24: US-guided CNB showed IDC, grade 2, ER >95% [place] 75% Her2-negative (1+).  - 3/27/24: Left lumpectomy and SLNBx performed. 3.0 cm of grade 2 IDC removed, no LVI, margins negative. Of 3 LN's removed, 1 contained ITC's. No TITO. Oncotype Dx RS 20 (intermediate risk), no chemotherapy benefit.  - Adjuvant RT completed 6/24/24 (Dr. Krueger).  - Started adjuvant anastrozole July 2024       Surgery: 3/27/2024: Left lumpectomy and sentinel lymph node biopsy  Radiation: 5/28/2024 - 6/24/2024: Completed radiation (Dr. Krueger); 4256 Gy with 1000 Gy tumor bed boost  Systemic therapy: 7/2024: Anastrozole started      Mammogram: 7/14/2025-left mammogram shows stable posttreatment changes in the left breast.  No evidence of malignancy; category 2  Radiology review: All images and reports were personally reviewed and the findings discussed with the patient.        Cancer Staging   Infiltrating ductal carcinoma of upper-outer quadrant of left breast in female  Staging form: Breast, AJCC 8th Edition  - Clinical stage from 2/5/2024: Stage IA - Signed by Barb Sanchez MD on 2/11/2025  cT1  cN0  cM0  Karan grade: G2  ER Status: Positive  NV Status: Positive  HER2 Status: Negative  - Pathologic stage from 3/27/2024: Stage IA - Signed by Barb Sanchez MD on 2/11/2025  pT2  pN0(i+)  cM0  Karan grade: G2  ER Status: Positive  NV Status: Positive  HER2 Status: Negative        Review of Systems   Constitutional symptoms: Denies generalized fatigue.  Denies  weight change, fevers/chills, difficulty sleeping   Eyes: Denies double vision, glaucoma, cataracts.  Ear/nose/throat/mouth: Denies hearing changes, sore throat, sinus problems.  Cardiovascular: No chest pain.  Denies irregular heartbeat.  Denies ankle swelling.  Respiratory: No wheezing, cough, or shortness of breath.  Gastrointestinal: No abdominal pain,  No nausea/vomiting.  No indigestion/heartburn.  No change in bowel habits.  No constipation or diarrhea.   Genitourinary: No urinary incontinence.  No urinary frequency.  No painful urination.  Musculoskeletal: No bone pain, no muscle pain, no joint pain.   Integumentary: No rash. No masses.  No changes in moles.  No easy bruising.  Neurological: No headaches.  No tremors. No numbness/tingling.  Psychiatric: No anxiety. No depression.  Endocrine: No excessive thirst.  Not too hot or too cold.  Not tired or fatigued.    Hematological/lymphatic: No swollen glands or blood clotting problems.  No bruising.    PHYSICAL EXAM:    General: Alert and oriented x 3.  Mood and affect are appropriate.  Neck: supple, no masses, no cervical adenopathy.  Cardiovascular: no lower extremity edema.  Pulmonary: breathing non labored on room air.  GI: Abdomen soft, no masses. No hepatomegaly or splenomegaly.  Lymph nodes: No supraclavicular or axillary adenopathy bilaterally.  Musculoskeletal: Full range of motion in the upper extremities bilaterally.  Neuro: denies dizziness, tremors  Physical Exam  Chest:        Comments: There is no cervical, supraclavicular, or axillary lymphadenopathy.  Breasts are symmetric bilaterally. In the right breast, there are no skin changes, no dominant masses, and no nipple discharge. In the left breast, there are no palpable masses.  There is some central edema and skin thickening.           Assessment/Plan     stage IA left breast cancer diagnosed 2/5/2024  -pT2N0(i+)M0              Invasive ductal carcinoma, grade 2 ; ER>95%, [place] 75%, Her 2 neg               ER>95%, [place] 75%, Her2 negative       Clinical breast exam and mammogram today are normal.  There is no evidence of cancer recurrence.    Continue follow up with medical oncology as scheduled.  Continue anastrozole.  Continue yearly mammograms.  Next bilateral mammogram due in January 2026.    Will follow up with me if there are any breast concerns.     Barb Sanchez MD

## 2025-07-29 ENCOUNTER — OFFICE VISIT (OUTPATIENT)
Dept: SURGICAL ONCOLOGY | Facility: CLINIC | Age: 65
End: 2025-07-29
Payer: MEDICARE

## 2025-07-29 VITALS
BODY MASS INDEX: 37.6 KG/M2 | SYSTOLIC BLOOD PRESSURE: 123 MMHG | HEART RATE: 78 BPM | WEIGHT: 199 LBS | RESPIRATION RATE: 18 BRPM | TEMPERATURE: 97 F | DIASTOLIC BLOOD PRESSURE: 79 MMHG

## 2025-07-29 DIAGNOSIS — C50.412 INFILTRATING DUCTAL CARCINOMA OF UPPER-OUTER QUADRANT OF LEFT BREAST IN FEMALE: Primary | ICD-10-CM

## 2025-07-29 PROCEDURE — 1126F AMNT PAIN NOTED NONE PRSNT: CPT | Performed by: SURGERY

## 2025-07-29 PROCEDURE — 1160F RVW MEDS BY RX/DR IN RCRD: CPT | Performed by: SURGERY

## 2025-07-29 PROCEDURE — 99214 OFFICE O/P EST MOD 30 MIN: CPT | Performed by: SURGERY

## 2025-07-29 PROCEDURE — 1036F TOBACCO NON-USER: CPT | Performed by: SURGERY

## 2025-07-29 PROCEDURE — 1159F MED LIST DOCD IN RCRD: CPT | Performed by: SURGERY

## 2025-07-29 ASSESSMENT — PAIN SCALES - GENERAL: PAINLEVEL_OUTOF10: 0-NO PAIN

## 2025-07-29 NOTE — PATIENT INSTRUCTIONS
You no longer need to see a breast surgeon. Please follow-up in the breast clinic as needed & continue yearly mammograms.

## 2025-08-02 DIAGNOSIS — E78.00 HYPERCHOLESTEROLEMIA: ICD-10-CM

## 2025-08-02 DIAGNOSIS — E03.8 OTHER SPECIFIED HYPOTHYROIDISM: ICD-10-CM

## 2025-08-12 ENCOUNTER — APPOINTMENT (OUTPATIENT)
Dept: LAB | Facility: CLINIC | Age: 65
End: 2025-08-12
Payer: COMMERCIAL

## 2025-08-13 ENCOUNTER — APPOINTMENT (OUTPATIENT)
Dept: HEMATOLOGY/ONCOLOGY | Facility: CLINIC | Age: 65
End: 2025-08-13
Payer: COMMERCIAL

## 2025-08-15 ENCOUNTER — LAB (OUTPATIENT)
Dept: LAB | Facility: CLINIC | Age: 65
End: 2025-08-15
Payer: MEDICARE

## 2025-08-15 DIAGNOSIS — C50.412 INFILTRATING DUCTAL CARCINOMA OF UPPER-OUTER QUADRANT OF LEFT BREAST IN FEMALE: ICD-10-CM

## 2025-08-15 DIAGNOSIS — Z91.89 AT HIGH RISK FOR OSTEOPOROSIS: ICD-10-CM

## 2025-08-15 LAB
ALBUMIN SERPL BCP-MCNC: 4.4 G/DL (ref 3.4–5)
ALP SERPL-CCNC: 61 U/L (ref 33–136)
ALT SERPL W P-5'-P-CCNC: 20 U/L (ref 7–45)
ANION GAP SERPL CALC-SCNC: 14 MMOL/L (ref 10–20)
AST SERPL W P-5'-P-CCNC: 21 U/L (ref 9–39)
BILIRUB SERPL-MCNC: 0.6 MG/DL (ref 0–1.2)
BUN SERPL-MCNC: 12 MG/DL (ref 6–23)
CALCIUM SERPL-MCNC: 9.2 MG/DL (ref 8.6–10.3)
CHLORIDE SERPL-SCNC: 103 MMOL/L (ref 98–107)
CO2 SERPL-SCNC: 27 MMOL/L (ref 21–32)
CREAT SERPL-MCNC: 1.02 MG/DL (ref 0.5–1.05)
EGFRCR SERPLBLD CKD-EPI 2021: 60 ML/MIN/1.73M*2
GLUCOSE SERPL-MCNC: 100 MG/DL (ref 74–99)
MAGNESIUM SERPL-MCNC: 2.19 MG/DL (ref 1.6–2.4)
POTASSIUM SERPL-SCNC: 3.7 MMOL/L (ref 3.5–5.3)
PROT SERPL-MCNC: 7.2 G/DL (ref 6.4–8.2)
SODIUM SERPL-SCNC: 140 MMOL/L (ref 136–145)

## 2025-08-15 PROCEDURE — 83735 ASSAY OF MAGNESIUM: CPT

## 2025-08-15 PROCEDURE — 84100 ASSAY OF PHOSPHORUS: CPT

## 2025-08-15 PROCEDURE — 36415 COLL VENOUS BLD VENIPUNCTURE: CPT

## 2025-08-15 PROCEDURE — 80053 COMPREHEN METABOLIC PANEL: CPT

## 2025-08-16 LAB — PHOSPHATE SERPL-MCNC: 3.5 MG/DL (ref 2.5–4.9)

## 2025-08-20 ENCOUNTER — INFUSION (OUTPATIENT)
Dept: HEMATOLOGY/ONCOLOGY | Facility: CLINIC | Age: 65
End: 2025-08-20
Payer: MEDICARE

## 2025-08-20 ENCOUNTER — OFFICE VISIT (OUTPATIENT)
Dept: HEMATOLOGY/ONCOLOGY | Facility: CLINIC | Age: 65
End: 2025-08-20
Payer: MEDICARE

## 2025-08-20 VITALS
RESPIRATION RATE: 18 BRPM | TEMPERATURE: 97.7 F | OXYGEN SATURATION: 95 % | WEIGHT: 200.9 LBS | HEART RATE: 64 BPM | DIASTOLIC BLOOD PRESSURE: 90 MMHG | SYSTOLIC BLOOD PRESSURE: 177 MMHG | BODY MASS INDEX: 37.96 KG/M2

## 2025-08-20 DIAGNOSIS — Z12.31 SCREENING MAMMOGRAM FOR BREAST CANCER: Primary | ICD-10-CM

## 2025-08-20 DIAGNOSIS — C50.412 INFILTRATING DUCTAL CARCINOMA OF UPPER-OUTER QUADRANT OF LEFT BREAST IN FEMALE: ICD-10-CM

## 2025-08-20 DIAGNOSIS — Z91.89 AT HIGH RISK FOR OSTEOPOROSIS: ICD-10-CM

## 2025-08-20 PROCEDURE — 99214 OFFICE O/P EST MOD 30 MIN: CPT

## 2025-08-20 PROCEDURE — 2500000004 HC RX 250 GENERAL PHARMACY W/ HCPCS (ALT 636 FOR OP/ED)

## 2025-08-20 PROCEDURE — 96365 THER/PROPH/DIAG IV INF INIT: CPT | Mod: INF

## 2025-08-20 PROCEDURE — 1126F AMNT PAIN NOTED NONE PRSNT: CPT

## 2025-08-20 RX ORDER — ALBUTEROL SULFATE 0.83 MG/ML
3 SOLUTION RESPIRATORY (INHALATION) AS NEEDED
OUTPATIENT
Start: 2026-01-28

## 2025-08-20 RX ORDER — ZOLEDRONIC ACID 0.04 MG/ML
4 INJECTION, SOLUTION INTRAVENOUS ONCE
OUTPATIENT
Start: 2026-01-28

## 2025-08-20 RX ORDER — HEPARIN 100 UNIT/ML
500 SYRINGE INTRAVENOUS AS NEEDED
OUTPATIENT
Start: 2025-08-20

## 2025-08-20 RX ORDER — FAMOTIDINE 10 MG/ML
20 INJECTION, SOLUTION INTRAVENOUS ONCE AS NEEDED
OUTPATIENT
Start: 2026-01-28

## 2025-08-20 RX ORDER — DIPHENHYDRAMINE HYDROCHLORIDE 50 MG/ML
50 INJECTION, SOLUTION INTRAMUSCULAR; INTRAVENOUS AS NEEDED
OUTPATIENT
Start: 2026-01-28

## 2025-08-20 RX ORDER — ZOLEDRONIC ACID 0.04 MG/ML
4 INJECTION, SOLUTION INTRAVENOUS ONCE
Status: COMPLETED | OUTPATIENT
Start: 2025-08-20 | End: 2025-08-20

## 2025-08-20 RX ORDER — EPINEPHRINE 0.3 MG/.3ML
0.3 INJECTION SUBCUTANEOUS EVERY 5 MIN PRN
OUTPATIENT
Start: 2026-01-28

## 2025-08-20 RX ORDER — HEPARIN SODIUM,PORCINE/PF 10 UNIT/ML
50 SYRINGE (ML) INTRAVENOUS AS NEEDED
OUTPATIENT
Start: 2025-08-20

## 2025-08-20 RX ADMIN — SODIUM CHLORIDE 500 ML: 0.9 INJECTION, SOLUTION INTRAVENOUS at 14:37

## 2025-08-20 RX ADMIN — ZOLEDRONIC ACID 4 MG: 0.04 INJECTION, SOLUTION INTRAVENOUS at 14:37

## 2025-08-20 ASSESSMENT — PAIN SCALES - GENERAL: PAINLEVEL_OUTOF10: 0-NO PAIN

## 2025-08-25 DIAGNOSIS — F41.8 DEPRESSION WITH ANXIETY: ICD-10-CM

## 2025-08-25 RX ORDER — SERTRALINE HYDROCHLORIDE 100 MG/1
TABLET, FILM COATED ORAL
Qty: 90 TABLET | Refills: 1 | Status: SHIPPED | OUTPATIENT
Start: 2025-08-25

## 2025-08-26 LAB
ALBUMIN SERPL-MCNC: 4.3 G/DL (ref 3.6–5.1)
ALBUMIN/GLOB SERPL: 1.7 (CALC) (ref 1–2.5)
ALP SERPL-CCNC: 61 U/L (ref 37–153)
ALT SERPL-CCNC: 21 U/L (ref 6–29)
AST SERPL-CCNC: 21 U/L (ref 10–35)
BILIRUB DIRECT SERPL-MCNC: 0.1 MG/DL
BILIRUB INDIRECT SERPL-MCNC: 0.9 MG/DL (CALC) (ref 0.2–1.2)
BILIRUB SERPL-MCNC: 1 MG/DL (ref 0.2–1.2)
CHOLEST SERPL-MCNC: 247 MG/DL
CHOLEST/HDLC SERPL: 3.6 (CALC)
GLOBULIN SER CALC-MCNC: 2.6 G/DL (CALC) (ref 1.9–3.7)
HDLC SERPL-MCNC: 69 MG/DL
LDLC SERPL CALC-MCNC: 155 MG/DL (CALC)
NONHDLC SERPL-MCNC: 178 MG/DL (CALC)
PROT SERPL-MCNC: 6.9 G/DL (ref 6.1–8.1)
T4 FREE SERPL-MCNC: 0.8 NG/DL (ref 0.8–1.8)
TRIGL SERPL-MCNC: 112 MG/DL
TSH SERPL-ACNC: >150 MIU/L (ref 0.4–4.5)

## 2025-09-02 ENCOUNTER — TELEPHONE (OUTPATIENT)
Dept: PRIMARY CARE | Facility: CLINIC | Age: 65
End: 2025-09-02

## 2025-09-02 ENCOUNTER — APPOINTMENT (OUTPATIENT)
Dept: PRIMARY CARE | Facility: CLINIC | Age: 65
End: 2025-09-02
Payer: MEDICARE

## 2025-09-02 DIAGNOSIS — E03.8 OTHER SPECIFIED HYPOTHYROIDISM: ICD-10-CM

## 2025-09-02 DIAGNOSIS — F41.8 DEPRESSION WITH ANXIETY: ICD-10-CM

## 2025-09-02 DIAGNOSIS — E78.00 HYPERCHOLESTEROLEMIA: ICD-10-CM

## 2025-09-02 DIAGNOSIS — E03.9 HYPOTHYROIDISM, UNSPECIFIED TYPE: ICD-10-CM

## 2025-09-02 ASSESSMENT — PATIENT HEALTH QUESTIONNAIRE - PHQ9
1. LITTLE INTEREST OR PLEASURE IN DOING THINGS: SEVERAL DAYS
SUM OF ALL RESPONSES TO PHQ9 QUESTIONS 1 AND 2: 1
2. FEELING DOWN, DEPRESSED OR HOPELESS: NOT AT ALL

## 2025-09-02 ASSESSMENT — PAIN SCALES - GENERAL: PAINLEVEL_OUTOF10: 0-NO PAIN

## 2026-03-23 ENCOUNTER — APPOINTMENT (OUTPATIENT)
Dept: PRIMARY CARE | Facility: CLINIC | Age: 66
End: 2026-03-23
Payer: COMMERCIAL

## (undated) DEVICE — BLADE, SURGICAL, POLYMER COATED P15, STERILE, DISP

## (undated) DEVICE — HEMOSTAT, ARISTA, ABSORBABLE, 3 GRAMS

## (undated) DEVICE — SUTURE, MONOCRYL, 4-0, 27 IN, PS-2, UNDYED

## (undated) DEVICE — SUTURE, VICRYL, 3-0, 27 IN, SH

## (undated) DEVICE — ADHESIVE, SKIN, MASTISOL, 2/3 CC VIAL

## (undated) DEVICE — KIT, MARGINMARKER, 6 INK COLORS, STANDARD

## (undated) DEVICE — CLIP, LIGATING, LIGACLIP EXTRA, SMALL, 26 MM, TITANIUM

## (undated) DEVICE — Device

## (undated) DEVICE — STRIP, SKIN CLOSURE, STERI STRIP, REINFORCED, 0.5 X 4 IN

## (undated) DEVICE — TIP, SUCTION, YANKAUER, BULB, ADULT

## (undated) DEVICE — DISSECTOR, EXACT, LIGASURE

## (undated) DEVICE — APPLICATOR, CHLORAPREP, W/ORANGE TINT, 26ML

## (undated) DEVICE — SOLUTION, IRRIGATION, X RX SODIUM CHL 0.9%, 1000ML BTL

## (undated) DEVICE — DRESSING, GAUZE, SPONGE, KERLIX, SUPER, 6 X 6.75 IN, STERILE 10PK

## (undated) DEVICE — PROBE COVER, INTRAOPERATIVE, 13 X 244CM (5 X 96IN)

## (undated) DEVICE — SUTURE, SILK, 2-0, 30 IN, SH, BLACK

## (undated) DEVICE — GLOVE, SURGICAL, PROTEXIS PI , 6.5, PF, LF

## (undated) DEVICE — ELECTRODE, ELECTROSURGICAL, BLADE, INSULATED, ENT/IMA, STERILE

## (undated) DEVICE — DRAPE, LEGGINGS, 48 X 31 IN, STERILE, LF

## (undated) DEVICE — GLOVE, SURGICAL, PROTEXIS PI BLUE W/NEUTHERA, 6.5, PF, LF